# Patient Record
Sex: MALE | Race: WHITE | NOT HISPANIC OR LATINO | Employment: FULL TIME | URBAN - METROPOLITAN AREA
[De-identification: names, ages, dates, MRNs, and addresses within clinical notes are randomized per-mention and may not be internally consistent; named-entity substitution may affect disease eponyms.]

---

## 2018-11-26 DIAGNOSIS — R68.89 EXERCISE INTOLERANCE: Primary | ICD-10-CM

## 2018-11-26 DIAGNOSIS — I25.10 CORONARY ARTERY DISEASE INVOLVING NATIVE CORONARY ARTERY OF NATIVE HEART WITHOUT ANGINA PECTORIS: ICD-10-CM

## 2019-01-11 ENCOUNTER — HOSPITAL ENCOUNTER (OUTPATIENT)
Dept: NON INVASIVE DIAGNOSTICS | Facility: CLINIC | Age: 64
Discharge: HOME | End: 2019-01-11
Payer: COMMERCIAL

## 2019-01-11 DIAGNOSIS — I25.10 CORONARY ARTERY DISEASE INVOLVING NATIVE CORONARY ARTERY OF NATIVE HEART WITHOUT ANGINA PECTORIS: ICD-10-CM

## 2019-01-11 DIAGNOSIS — R68.89 EXERCISE INTOLERANCE: ICD-10-CM

## 2019-01-11 LAB
CHEST PAIN STATEMENT: NORMAL
MAX DIASTOLIC BP: 70 MMHG
MAX HEART RATE: 108 BPM
MAX PREDICTED HEART RATE: 155 BPM
MAX. SYSTOLIC BP: 130 MMHG
PROTOCOL NAME: NORMAL
REASON FOR TERMINATION: NORMAL
TARGET HR FORMULA: NORMAL
TEST INDICATION: NORMAL
TIME IN EXERCISE PHASE: NORMAL

## 2019-01-11 PROCEDURE — A9502 TC99M TETROFOSMIN: HCPCS

## 2019-01-11 PROCEDURE — 93017 CV STRESS TEST TRACING ONLY: CPT

## 2019-01-11 PROCEDURE — 78452 HT MUSCLE IMAGE SPECT MULT: CPT | Performed by: INTERNAL MEDICINE

## 2019-01-11 PROCEDURE — 93016 CV STRESS TEST SUPVJ ONLY: CPT | Performed by: INTERNAL MEDICINE

## 2019-01-11 PROCEDURE — 78452 HT MUSCLE IMAGE SPECT MULT: CPT

## 2019-01-11 PROCEDURE — 93018 CV STRESS TEST I&R ONLY: CPT | Performed by: INTERNAL MEDICINE

## 2019-01-11 RX ADMIN — REGADENOSON 0.4 MG: 0.08 INJECTION, SOLUTION INTRAVENOUS at 13:55

## 2019-03-16 ENCOUNTER — APPOINTMENT (OUTPATIENT)
Dept: LAB | Facility: HOSPITAL | Age: 64
End: 2019-03-16
Payer: COMMERCIAL

## 2019-03-16 ENCOUNTER — TRANSCRIBE ORDERS (OUTPATIENT)
Dept: ADMINISTRATIVE | Facility: HOSPITAL | Age: 64
End: 2019-03-16

## 2019-03-16 DIAGNOSIS — E78.2 MIXED HYPERLIPIDEMIA: ICD-10-CM

## 2019-03-16 DIAGNOSIS — Z12.5 SPECIAL SCREENING FOR MALIGNANT NEOPLASM OF PROSTATE: ICD-10-CM

## 2019-03-16 DIAGNOSIS — E78.2 MIXED HYPERLIPIDEMIA: Primary | ICD-10-CM

## 2019-03-16 LAB
ALBUMIN SERPL BCP-MCNC: 4.6 G/DL (ref 3.5–5.7)
ALP SERPL-CCNC: 38 U/L (ref 55–165)
ALT SERPL W P-5'-P-CCNC: 31 U/L (ref 7–52)
ANION GAP SERPL CALCULATED.3IONS-SCNC: 9 MMOL/L (ref 4–13)
AST SERPL W P-5'-P-CCNC: 26 U/L (ref 13–39)
BILIRUB SERPL-MCNC: 0.6 MG/DL (ref 0.2–1)
BUN SERPL-MCNC: 14 MG/DL (ref 7–25)
CALCIUM SERPL-MCNC: 9.7 MG/DL (ref 8.6–10.5)
CHLORIDE SERPL-SCNC: 106 MMOL/L (ref 98–107)
CHOLEST SERPL-MCNC: 173 MG/DL (ref 0–200)
CO2 SERPL-SCNC: 24 MMOL/L (ref 21–31)
CREAT SERPL-MCNC: 0.87 MG/DL (ref 0.7–1.3)
GFR SERPL CREATININE-BSD FRML MDRD: 90 ML/MIN/1.73SQ M
GLUCOSE P FAST SERPL-MCNC: 107 MG/DL (ref 65–99)
HDLC SERPL-MCNC: 67 MG/DL (ref 40–60)
LDLC SERPL CALC-MCNC: 78 MG/DL (ref 75–193)
NONHDLC SERPL-MCNC: 106 MG/DL
POTASSIUM SERPL-SCNC: 4 MMOL/L (ref 3.5–5.5)
PROT SERPL-MCNC: 7.3 G/DL (ref 6.4–8.9)
PSA SERPL-MCNC: 1.8 NG/ML (ref 0–4)
SODIUM SERPL-SCNC: 139 MMOL/L (ref 134–143)
TRIGL SERPL-MCNC: 138 MG/DL (ref 44–166)

## 2019-03-16 PROCEDURE — 80053 COMPREHEN METABOLIC PANEL: CPT

## 2019-03-16 PROCEDURE — 80061 LIPID PANEL: CPT

## 2019-03-16 PROCEDURE — 36415 COLL VENOUS BLD VENIPUNCTURE: CPT

## 2019-03-16 PROCEDURE — G0103 PSA SCREENING: HCPCS

## 2019-07-31 ENCOUNTER — TELEPHONE (OUTPATIENT)
Dept: CARDIOLOGY CLINIC | Facility: CLINIC | Age: 64
End: 2019-07-31

## 2019-07-31 NOTE — TELEPHONE ENCOUNTER
Bertram Pearson stated he is having muscles aches with lipitor and his PCP stated to check with his cardiologist if he should try crestor or something else  Dr Dario Norton said to stop altogether for 2 weeks then try lipitor every other day  When I called Bertram Pearson, he said he did already stop it over the weekend and he said he isn't sure if its in his head or not, but he already feels better  He will call us with an update  Scanned note in under media

## 2019-08-09 ENCOUNTER — TRANSCRIBE ORDERS (OUTPATIENT)
Dept: ADMINISTRATIVE | Facility: HOSPITAL | Age: 64
End: 2019-08-09

## 2019-08-09 ENCOUNTER — HOSPITAL ENCOUNTER (OUTPATIENT)
Dept: RADIOLOGY | Facility: IMAGING CENTER | Age: 64
Discharge: HOME | End: 2019-08-09
Payer: COMMERCIAL

## 2019-08-09 DIAGNOSIS — S43.62XA SPRAIN OF LEFT STERNOCLAVICULAR JOINT, INITIAL ENCOUNTER: Primary | ICD-10-CM

## 2019-08-09 DIAGNOSIS — S43.62XA SPRAIN OF LEFT STERNOCLAVICULAR JOINT, INITIAL ENCOUNTER: ICD-10-CM

## 2019-08-09 PROCEDURE — 73000 X-RAY EXAM OF COLLAR BONE: CPT

## 2019-08-09 PROCEDURE — 71130 X-RAY STRENOCLAVIC JT 3/>VWS: CPT

## 2019-08-19 ENCOUNTER — OFFICE VISIT (OUTPATIENT)
Dept: CARDIOLOGY CLINIC | Facility: CLINIC | Age: 64
End: 2019-08-19
Payer: COMMERCIAL

## 2019-08-19 VITALS
HEART RATE: 64 BPM | SYSTOLIC BLOOD PRESSURE: 126 MMHG | BODY MASS INDEX: 35.79 KG/M2 | WEIGHT: 250 LBS | HEIGHT: 70 IN | DIASTOLIC BLOOD PRESSURE: 78 MMHG

## 2019-08-19 DIAGNOSIS — I73.9 CLAUDICATION OF BOTH LOWER EXTREMITIES (HCC): ICD-10-CM

## 2019-08-19 DIAGNOSIS — I25.10 CORONARY ARTERY DISEASE INVOLVING NATIVE CORONARY ARTERY OF NATIVE HEART WITHOUT ANGINA PECTORIS: Primary | ICD-10-CM

## 2019-08-19 DIAGNOSIS — E78.5 DYSLIPIDEMIA: ICD-10-CM

## 2019-08-19 DIAGNOSIS — Z95.5 H/O HEART ARTERY STENT: ICD-10-CM

## 2019-08-19 PROCEDURE — 99214 OFFICE O/P EST MOD 30 MIN: CPT | Performed by: INTERNAL MEDICINE

## 2019-08-19 RX ORDER — CHLORAL HYDRATE 500 MG
CAPSULE ORAL DAILY
COMMUNITY
End: 2019-08-19 | Stop reason: ALTCHOICE

## 2019-08-19 RX ORDER — TAMSULOSIN HYDROCHLORIDE 0.4 MG/1
0.8 CAPSULE ORAL
COMMUNITY

## 2019-08-19 RX ORDER — ROSUVASTATIN CALCIUM 10 MG/1
10 TABLET, COATED ORAL DAILY
Qty: 90 TABLET | Refills: 3 | Status: SHIPPED | OUTPATIENT
Start: 2019-08-19 | End: 2020-06-15 | Stop reason: SDUPTHER

## 2019-08-19 NOTE — PATIENT INSTRUCTIONS
Stop your fish oil    Coronary Artery Disease   AMBULATORY CARE:   Coronary artery disease (CAD)  is narrowing of the arteries to your heart caused by a buildup of plaque  Plaque is made up of cholesterol and other substances  The narrowing in your arteries decreases the amount of blood that can flow to your heart  This causes your heart to get less oxygen  You may not have any symptoms of CAD  It is important for you to manage CAD even if you feel well  CAD can lead to a heart attack if it is not managed  Common symptoms include the following:   · Chest pain (angina), causing burning, squeezing, or crushing tightness in your chest    · Pain that spreads to your neck, jaw, or shoulder blade    · Nausea, vomiting, sweating, fainting, and hands and feet that are cold to the touch  Call 911 for any of the following:   · You have any of the following signs of a heart attack:      ¨ Squeezing, pressure, or pain in your chest that lasts longer than 5 minutes or returns    ¨ Discomfort or pain in your back, neck, jaw, stomach, or arm     ¨ Trouble breathing    ¨ Nausea or vomiting    ¨ Lightheadedness or a sudden cold sweat, especially with chest pain or trouble breathing    Contact your healthcare provider if:   · You have chest pain that is more frequent, or you have chest pain at rest     · You have questions or concerns about your condition or care  Medicines used to treat CAD:   · Blood pressure medicines  are given to lower your blood pressure  ACE inhibitors help keep your blood vessels relaxed and open to help keep blood flowing into your heart  Beta-blockers keep your heart pumping strongly and regularly so it does not work too hard to get oxygen  · Cholesterol medicines  help lower blood cholesterol levels  · Nitrates , such as nitroglycerin, relax the arteries of your heart so it gets more oxygen  They help to relieve your chest pain  · Antiplatelets , such as aspirin, help prevent blood clots  Take your antiplatelet medicine exactly as directed  These medicines make it more likely for you to bleed or bruise  If you are told to take aspirin, do not take acetaminophen or ibuprofen instead  · Blood thinners  keep clots from forming in your blood  Clots may cause heart attacks, strokes, or death  This medicine makes it more likely for you to bleed or bruise  · Do not take certain medicines without asking your healthcare provider first   These include NSAIDs, herbal or vitamin supplements, or hormones (estrogen or progestin)  Procedures used to treat CAD:   · Angioplasty  may be done to open an artery blocked by plaque  A tube with a balloon on the end is threaded into the blocked artery  Once the tube is in the artery, the balloon is inflated  As the balloon inflates, it presses the plaque against the artery wall to open the artery  A stent may be placed in your artery to keep it open  · Coronary artery bypass graft surgery (CABG)  is open heart surgery  Healthcare providers take arteries or veins from other areas in your body and use them to bypass or go around the blocked arteries of your heart  Cardiac rehabilitation:  Your healthcare provider may recommend that you attend cardiac rehabilitation (rehab)  This is a program run by specialists who will help you safely strengthen your heart and reduce the risk for more heart disease  The plan includes exercise, relaxation, stress management, and heart-healthy nutrition  Healthcare providers will also check to make sure any medicines you are taking are working  Manage CAD to prevent a heart attack:   · Do not smoke  Nicotine and other chemicals in cigarettes and cigars can cause heart and lung damage  Ask your healthcare provider for information if you currently smoke and need help to quit  E-cigarettes or smokeless tobacco still contain nicotine  Talk to your healthcare provider before you use these products  · Exercise regularly  Exercise at least 30 minutes each day, on most days of the week  Exercise helps to lower high cholesterol and high blood pressure  It can also help you maintain a healthy weight  Ask your healthcare provider about the kind of exercise you should do and how to get started  · Maintain a healthy weight  If you are overweight, talk to your healthcare provider about how to lose weight  A weight loss of 10% can improve your heart health  · Eat heart-healthy foods  Include fresh fruits and vegetables in your meal plan  Choose low-fat foods, such as skim or 1% fat milk, low-fat cheese and yogurt, fish, chicken (without skin), and lean meats  Eat two 4-ounce servings of fish high in omega-3 fats each week, such as salmon, fresh tuna, and herring  Do not eat foods that are high in sodium, such as canned foods, potato chips, salty snacks, and cold cuts  Put less table salt on your food  · Limit or do not drink alcohol  A drink of alcohol is 12 ounces of beer, 5 ounces of wine, or 1½ ounces of liquor  · Manage other health conditions  Follow your healthcare provider's advice on how to manage other conditions that can affect your heart health  These include diabetes, high blood pressure, and high cholesterol  You may need to take medicines for these conditions and make other lifestyle changes  · Ask if you should have a flu vaccine  The flu can be dangerous for a person who has CAD  The flu vaccine is available every year in the fall  Follow up with your healthcare provider as directed: You may need to return for other tests  You may also be referred to a cardiac surgeon  Write down your questions so you remember to ask them during your visits  © 2017 [employer] Information is for End User's use only and may not be sold, redistributed or otherwise used for commercial purposes   All illustrations and images included in CareNotes® are the copyrighted property of [employer]  or [employer]  The above information is an  only  It is not intended as medical advice for individual conditions or treatments  Talk to your doctor, nurse or pharmacist before following any medical regimen to see if it is safe and effective for you

## 2019-08-19 NOTE — PROGRESS NOTES
Subjective:        Patient ID: Vanice Cowden is a 75 y o  male  Chief Complaint:  Arlette Martini is here for routine cardiac follow-up  He status post RCA stenting in 2009  He is an ex-smoker  Lipid profile was excellent in March of this year  No chest pain alarming shortness of breath palpitations presyncope syncope or TIA like symptoms  He stopped his atorvastatin because his legs, particularly his thighs, get very tired when he ambulates, this gets better with few minutes rest and then he can walk again  In 2 weeks off the atorvastatin with your blessing he says he does feel a bit better  He is wondering if this isn't subjective though, he is not quite sure  He was open to feel much better and  He has not  Exam suggest diminished PT pulses bilaterally +1 radial pulses +2 bilaterally carotid upstroke 2 to 3+ bilaterally    The following portions of the patient's history were reviewed and updated as appropriate: allergies, current medications, past family history, past medical history, past social history, past surgical history and problem list   Review of Systems   Constitution: Negative for chills, diaphoresis, malaise/fatigue and weight gain  HENT: Negative for nosebleeds and stridor  Eyes: Negative for double vision, vision loss in left eye, vision loss in right eye and visual disturbance  Cardiovascular: Positive for claudication  Negative for chest pain, cyanosis, dyspnea on exertion, irregular heartbeat, leg swelling, near-syncope, orthopnea, palpitations, paroxysmal nocturnal dyspnea and syncope  Respiratory: Negative for cough, shortness of breath, snoring and wheezing  Endocrine: Negative for polydipsia, polyphagia and polyuria  Hematologic/Lymphatic: Negative for bleeding problem  Does not bruise/bleed easily  Skin: Negative for flushing and rash  Musculoskeletal: Negative for falls and myalgias     Gastrointestinal: Negative for abdominal pain, heartburn, hematemesis, hematochezia, melena and nausea  Genitourinary: Negative for hematuria  Neurological: Negative for brief paralysis, dizziness, focal weakness, headaches, light-headedness, loss of balance and vertigo  Psychiatric/Behavioral: Negative for altered mental status and substance abuse  Allergic/Immunologic: Negative for hives  Objective:      /78   Pulse 64   Ht 5' 10" (1 778 m)   Wt 113 kg (250 lb)   BMI 35 87 kg/m²   Physical Exam   Constitutional: He is oriented to person, place, and time  He appears well-developed and well-nourished  No distress  HENT:   Head: Normocephalic and atraumatic  Eyes: Pupils are equal, round, and reactive to light  EOM are normal  No scleral icterus  Neck: Normal range of motion  Neck supple  No JVD present  No thyromegaly present  Cardiovascular: Normal rate, regular rhythm and normal heart sounds  Exam reveals no gallop and no friction rub  No murmur heard  Pulmonary/Chest: Effort normal and breath sounds normal  No stridor  No respiratory distress  He has no wheezes  He has no rales  Abdominal: Soft  Bowel sounds are normal  He exhibits no distension and no mass  There is no tenderness  Musculoskeletal: Normal range of motion  He exhibits no edema or deformity  Neurological: He is alert and oriented to person, place, and time  Coordination normal    Skin: Skin is warm and dry  No erythema  No pallor  Psychiatric: He has a normal mood and affect   His behavior is normal        Lab Review:   Appointment on 03/16/2019   Component Date Value    Sodium 03/16/2019 139     Potassium 03/16/2019 4 0     Chloride 03/16/2019 106     CO2 03/16/2019 24     ANION GAP 03/16/2019 9     BUN 03/16/2019 14     Creatinine 03/16/2019 0 87     Glucose, Fasting 03/16/2019 107*    Calcium 03/16/2019 9 7     AST 03/16/2019 26     ALT 03/16/2019 31     Alkaline Phosphatase 03/16/2019 38*    Total Protein 03/16/2019 7 3     Albumin 03/16/2019 4 6     Total Bilirubin 03/16/2019 0 60     eGFR 03/16/2019 90     PSA 03/16/2019 1 8     Cholesterol 03/16/2019 173     Triglycerides 03/16/2019 138     HDL, Direct 03/16/2019 67*    LDL Calculated 03/16/2019 78     Non-HDL-Chol (CHOL-HDL) 03/16/2019 106      Xr Sternoclavicular Joints 4+ Vws    Result Date: 8/14/2019  Narrative: STERNOCLAVICULAR JOINTS INDICATION:   S43  62XA: Sprain of left sternoclavicular joint, initial encounter  COMPARISON:  None VIEWS:  XR STERNOCLAVICULAR JOINTS 4+ VWS FINDINGS: There is no acute fracture  The osseous structures appear normally aligned on these views  Alignment is best evaluated by direct physical exam   Degenerative changes of the bilateral sternoclavicular joints  Soft tissues are unremarkable  Impression: No acute osseous abnormality  Degenerative changes the bilateral sternoclavicular joint  Workstation performed: [id number]     Xr Clavicle Left    Result Date: 8/14/2019  Narrative: LEFT CLAVICLE INDICATION:   S43  62XA: Sprain of left sternoclavicular joint, initial encounter  COMPARISON:  None VIEWS:  XR CLAVICLE LEFT FINDINGS: There is no acute fracture or dislocation  There is a BB overlying the medial aspect of the clavicle  Moderate degenerative changes of the acromioclavicular joint with well-corticated densities in the soft tissues just dorsal to the acromion midclavicular joint  Mild degenerative changes of the glenohumeral joint  Well-corticated density in the soft tissues above the humeral head  No lytic or blastic lesions are seen  Soft tissues are unremarkable  Impression: 1  No acute osseous abnormality  2   Moderate degenerative changes acromioclavicular joint and mild degenerative changes glenohumeral joint  Workstation performed: [id number]         Assessment:       1   Coronary artery disease involving native coronary artery of native heart without angina pectoris  VAS abdominal aorta/iliacs; complete study    VAS lower limb venous duplex study, complete bilateral    rosuvastatin (CRESTOR) 10 MG tablet    AST    ALT    LDL cholesterol, direct   2  H/O heart artery stent     3  Dyslipidemia  AST    ALT    LDL cholesterol, direct   4  Claudication of both lower extremities ([address] )          Plan:        Arlette Martini is without any signs or symptoms reminiscent of angina pectoris, heart failure nor electrical instability  Nuclear stress test in January this year nonischemic revealed normal LV function auscultation does not suggest any valvulopathy  Rather than cholesterol-lowering agent myositis clinically he sounds like he is having more claudication  For this reason I ordered an abdominal ultrasound since he is over 60   And used to smoke to rule out AAA and lower extremity arterial Doppler study bilaterally  He remains smoke-free  For this reason I would like to try him on alternative statin, lower dose, Crestor 10 mg daily  He will stay off atorvastatin  If severe muscle aches and pains arise I told to give me a call and I will check a serum CPK /some blood work to confirm  or refute CLAM  Explained the importance statin therapy in his management  I ordered transaminases and LDL direct cholesterol in 8 weeks  I told him he could stop his fish oil which he has been taking for cholesterol health  This may raise LDL cholesterol  I will bring him back in 2 months to review his blood work , see how he is doing on the new statin, and Doppler studies

## 2019-09-19 ENCOUNTER — HOSPITAL ENCOUNTER (OUTPATIENT)
Dept: NON INVASIVE DIAGNOSTICS | Facility: CLINIC | Age: 64
Discharge: HOME | End: 2019-09-19
Payer: COMMERCIAL

## 2019-09-19 ENCOUNTER — TRANSCRIBE ORDERS (OUTPATIENT)
Dept: NON INVASIVE DIAGNOSTICS | Facility: CLINIC | Age: 64
End: 2019-09-19

## 2019-09-19 ENCOUNTER — APPOINTMENT (OUTPATIENT)
Dept: NON INVASIVE DIAGNOSTICS | Facility: CLINIC | Age: 64
End: 2019-09-19
Payer: COMMERCIAL

## 2019-09-19 DIAGNOSIS — I73.9 CLAUDICATION (HCC): Primary | ICD-10-CM

## 2019-09-19 DIAGNOSIS — I25.10 CORONARY ARTERY DISEASE INVOLVING NATIVE CORONARY ARTERY OF NATIVE HEART WITHOUT ANGINA PECTORIS: ICD-10-CM

## 2019-09-19 DIAGNOSIS — I73.9 CLAUDICATION (HCC): ICD-10-CM

## 2019-09-19 PROCEDURE — 93978 VASCULAR STUDY: CPT

## 2019-09-19 PROCEDURE — 93925 LOWER EXTREMITY STUDY: CPT | Performed by: SURGERY

## 2019-09-19 PROCEDURE — 93923 UPR/LXTR ART STDY 3+ LVLS: CPT

## 2019-09-19 PROCEDURE — 93925 LOWER EXTREMITY STUDY: CPT

## 2019-09-19 PROCEDURE — 93922 UPR/L XTREMITY ART 2 LEVELS: CPT | Performed by: SURGERY

## 2019-09-19 PROCEDURE — 93978 VASCULAR STUDY: CPT | Performed by: SURGERY

## 2020-04-07 ENCOUNTER — OFFICE VISIT (OUTPATIENT)
Dept: CARDIOLOGY CLINIC | Facility: CLINIC | Age: 65
End: 2020-04-07
Payer: COMMERCIAL

## 2020-04-07 ENCOUNTER — TRANSCRIBE ORDERS (OUTPATIENT)
Dept: LAB | Facility: CLINIC | Age: 65
End: 2020-04-07

## 2020-04-07 ENCOUNTER — APPOINTMENT (OUTPATIENT)
Dept: LAB | Facility: CLINIC | Age: 65
End: 2020-04-07
Payer: COMMERCIAL

## 2020-04-07 VITALS
BODY MASS INDEX: 36.22 KG/M2 | DIASTOLIC BLOOD PRESSURE: 70 MMHG | WEIGHT: 253 LBS | SYSTOLIC BLOOD PRESSURE: 104 MMHG | HEIGHT: 70 IN | HEART RATE: 68 BPM

## 2020-04-07 DIAGNOSIS — E78.2 MIXED HYPERLIPIDEMIA: Primary | ICD-10-CM

## 2020-04-07 DIAGNOSIS — I25.10 CORONARY ARTERY DISEASE INVOLVING NATIVE CORONARY ARTERY OF NATIVE HEART WITHOUT ANGINA PECTORIS: ICD-10-CM

## 2020-04-07 DIAGNOSIS — E78.5 DYSLIPIDEMIA: ICD-10-CM

## 2020-04-07 DIAGNOSIS — Z12.5 SCREENING FOR PROSTATE CANCER: ICD-10-CM

## 2020-04-07 DIAGNOSIS — E78.2 MIXED HYPERLIPIDEMIA: ICD-10-CM

## 2020-04-07 DIAGNOSIS — I25.10 CORONARY ARTERY DISEASE INVOLVING NATIVE CORONARY ARTERY OF NATIVE HEART WITHOUT ANGINA PECTORIS: Primary | ICD-10-CM

## 2020-04-07 LAB
ALBUMIN SERPL BCP-MCNC: 4 G/DL (ref 3.5–5)
ALP SERPL-CCNC: 57 U/L (ref 46–116)
ALT SERPL W P-5'-P-CCNC: 39 U/L (ref 12–78)
ANION GAP SERPL CALCULATED.3IONS-SCNC: 6 MMOL/L (ref 4–13)
AST SERPL W P-5'-P-CCNC: 27 U/L (ref 5–45)
BILIRUB SERPL-MCNC: 0.57 MG/DL (ref 0.2–1)
BUN SERPL-MCNC: 12 MG/DL (ref 5–25)
CALCIUM SERPL-MCNC: 9.2 MG/DL (ref 8.3–10.1)
CHLORIDE SERPL-SCNC: 110 MMOL/L (ref 100–108)
CHOLEST SERPL-MCNC: 162 MG/DL (ref 50–200)
CO2 SERPL-SCNC: 25 MMOL/L (ref 21–32)
CREAT SERPL-MCNC: 0.82 MG/DL (ref 0.6–1.3)
GFR SERPL CREATININE-BSD FRML MDRD: 92 ML/MIN/1.73SQ M
GLUCOSE P FAST SERPL-MCNC: 97 MG/DL (ref 65–99)
HDLC SERPL-MCNC: 62 MG/DL
LDLC SERPL CALC-MCNC: 65 MG/DL (ref 0–100)
LDLC SERPL DIRECT ASSAY-MCNC: 74 MG/DL (ref 0–100)
NONHDLC SERPL-MCNC: 100 MG/DL
POTASSIUM SERPL-SCNC: 4.5 MMOL/L (ref 3.5–5.3)
PROT SERPL-MCNC: 7.2 G/DL (ref 6.4–8.2)
PSA SERPL-MCNC: 2.3 NG/ML (ref 0–4)
SODIUM SERPL-SCNC: 141 MMOL/L (ref 136–145)
TRIGL SERPL-MCNC: 175 MG/DL

## 2020-04-07 PROCEDURE — 93000 ELECTROCARDIOGRAM COMPLETE: CPT | Performed by: PHYSICIAN ASSISTANT

## 2020-04-07 PROCEDURE — 36415 COLL VENOUS BLD VENIPUNCTURE: CPT

## 2020-04-07 PROCEDURE — G0103 PSA SCREENING: HCPCS

## 2020-04-07 PROCEDURE — 99214 OFFICE O/P EST MOD 30 MIN: CPT | Performed by: PHYSICIAN ASSISTANT

## 2020-04-07 PROCEDURE — 80061 LIPID PANEL: CPT

## 2020-04-07 PROCEDURE — 83721 ASSAY OF BLOOD LIPOPROTEIN: CPT

## 2020-04-07 PROCEDURE — 80053 COMPREHEN METABOLIC PANEL: CPT

## 2020-06-15 DIAGNOSIS — I25.10 CORONARY ARTERY DISEASE INVOLVING NATIVE CORONARY ARTERY OF NATIVE HEART WITHOUT ANGINA PECTORIS: ICD-10-CM

## 2020-06-17 RX ORDER — ROSUVASTATIN CALCIUM 10 MG/1
10 TABLET, COATED ORAL DAILY
Qty: 90 TABLET | Refills: 3 | Status: SHIPPED | OUTPATIENT
Start: 2020-06-17

## 2020-09-01 ENCOUNTER — TRANSCRIBE ORDERS (OUTPATIENT)
Dept: LAB | Facility: CLINIC | Age: 65
End: 2020-09-01

## 2020-09-01 ENCOUNTER — APPOINTMENT (OUTPATIENT)
Dept: LAB | Facility: CLINIC | Age: 65
End: 2020-09-01
Payer: COMMERCIAL

## 2020-09-01 DIAGNOSIS — R25.2 MUSCLE CRAMPS: Primary | ICD-10-CM

## 2020-09-01 DIAGNOSIS — R25.2 MUSCLE CRAMPS: ICD-10-CM

## 2020-09-01 LAB
CK MB SERPL-MCNC: 2 % (ref 0–2.5)
CK MB SERPL-MCNC: 6.7 NG/ML (ref 0–5)
CK SERPL-CCNC: 335 U/L (ref 39–308)
ERYTHROCYTE [SEDIMENTATION RATE] IN BLOOD: 12 MM/HOUR (ref 0–19)
TSH SERPL DL<=0.05 MIU/L-ACNC: 1.62 UIU/ML (ref 0.36–3.74)

## 2020-09-01 PROCEDURE — 84443 ASSAY THYROID STIM HORMONE: CPT

## 2020-09-01 PROCEDURE — 36415 COLL VENOUS BLD VENIPUNCTURE: CPT

## 2020-09-01 PROCEDURE — 82550 ASSAY OF CK (CPK): CPT

## 2020-09-01 PROCEDURE — 82085 ASSAY OF ALDOLASE: CPT

## 2020-09-01 PROCEDURE — 82553 CREATINE MB FRACTION: CPT

## 2020-09-01 PROCEDURE — 85652 RBC SED RATE AUTOMATED: CPT

## 2020-09-02 LAB — ALDOLASE SERPL-CCNC: 4.8 U/L (ref 3.3–10.3)

## 2021-01-18 ENCOUNTER — APPOINTMENT (OUTPATIENT)
Dept: RADIOLOGY | Facility: CLINIC | Age: 66
End: 2021-01-18
Payer: COMMERCIAL

## 2021-01-18 ENCOUNTER — TRANSCRIBE ORDERS (OUTPATIENT)
Dept: URGENT CARE | Facility: CLINIC | Age: 66
End: 2021-01-18

## 2021-01-18 DIAGNOSIS — M25.559 HIP PAIN: ICD-10-CM

## 2021-01-18 DIAGNOSIS — M54.50 LOW BACK PAIN, UNSPECIFIED BACK PAIN LATERALITY, UNSPECIFIED CHRONICITY, UNSPECIFIED WHETHER SCIATICA PRESENT: ICD-10-CM

## 2021-01-18 DIAGNOSIS — M54.50 LOW BACK PAIN, UNSPECIFIED BACK PAIN LATERALITY, UNSPECIFIED CHRONICITY, UNSPECIFIED WHETHER SCIATICA PRESENT: Primary | ICD-10-CM

## 2021-01-18 PROCEDURE — 72170 X-RAY EXAM OF PELVIS: CPT

## 2021-01-18 PROCEDURE — 72110 X-RAY EXAM L-2 SPINE 4/>VWS: CPT

## 2021-01-18 PROCEDURE — 72070 X-RAY EXAM THORAC SPINE 2VWS: CPT

## 2021-05-21 ENCOUNTER — TRANSCRIBE ORDERS (OUTPATIENT)
Dept: LAB | Facility: CLINIC | Age: 66
End: 2021-05-21

## 2021-05-21 ENCOUNTER — APPOINTMENT (OUTPATIENT)
Dept: LAB | Facility: CLINIC | Age: 66
End: 2021-05-21
Payer: COMMERCIAL

## 2021-05-21 DIAGNOSIS — Z12.5 SCREENING FOR PROSTATE CANCER: ICD-10-CM

## 2021-05-21 DIAGNOSIS — E78.2 MIXED HYPERLIPIDEMIA: ICD-10-CM

## 2021-05-21 DIAGNOSIS — E78.2 MIXED HYPERLIPIDEMIA: Primary | ICD-10-CM

## 2021-05-21 LAB
ALBUMIN SERPL BCP-MCNC: 4.1 G/DL (ref 3.5–5)
ALP SERPL-CCNC: 47 U/L (ref 46–116)
ALT SERPL W P-5'-P-CCNC: 39 U/L (ref 12–78)
ANION GAP SERPL CALCULATED.3IONS-SCNC: 5 MMOL/L (ref 4–13)
AST SERPL W P-5'-P-CCNC: 26 U/L (ref 5–45)
BILIRUB SERPL-MCNC: 0.68 MG/DL (ref 0.2–1)
BUN SERPL-MCNC: 14 MG/DL (ref 5–25)
CALCIUM SERPL-MCNC: 9.5 MG/DL (ref 8.3–10.1)
CHLORIDE SERPL-SCNC: 110 MMOL/L (ref 100–108)
CHOLEST SERPL-MCNC: 169 MG/DL (ref 50–200)
CO2 SERPL-SCNC: 26 MMOL/L (ref 21–32)
CREAT SERPL-MCNC: 0.83 MG/DL (ref 0.6–1.3)
GFR SERPL CREATININE-BSD FRML MDRD: 90 ML/MIN/1.73SQ M
GLUCOSE P FAST SERPL-MCNC: 94 MG/DL (ref 65–99)
HDLC SERPL-MCNC: 74 MG/DL
LDLC SERPL CALC-MCNC: 70 MG/DL (ref 0–100)
NONHDLC SERPL-MCNC: 95 MG/DL
POTASSIUM SERPL-SCNC: 4.7 MMOL/L (ref 3.5–5.3)
PROT SERPL-MCNC: 7.3 G/DL (ref 6.4–8.2)
PSA SERPL-MCNC: 2.8 NG/ML (ref 0–4)
SODIUM SERPL-SCNC: 141 MMOL/L (ref 136–145)
TRIGL SERPL-MCNC: 127 MG/DL

## 2021-05-21 PROCEDURE — G0103 PSA SCREENING: HCPCS

## 2021-05-21 PROCEDURE — 80061 LIPID PANEL: CPT

## 2021-05-21 PROCEDURE — 80053 COMPREHEN METABOLIC PANEL: CPT

## 2021-05-21 PROCEDURE — 36415 COLL VENOUS BLD VENIPUNCTURE: CPT

## 2021-06-21 ENCOUNTER — OFFICE VISIT (OUTPATIENT)
Dept: CARDIOLOGY CLINIC | Facility: CLINIC | Age: 66
End: 2021-06-21
Payer: COMMERCIAL

## 2021-06-21 VITALS
BODY MASS INDEX: 35.93 KG/M2 | SYSTOLIC BLOOD PRESSURE: 124 MMHG | WEIGHT: 251 LBS | HEIGHT: 70 IN | DIASTOLIC BLOOD PRESSURE: 70 MMHG

## 2021-06-21 DIAGNOSIS — E78.2 MIXED HYPERLIPIDEMIA: Primary | ICD-10-CM

## 2021-06-21 DIAGNOSIS — I25.10 CORONARY ARTERY DISEASE INVOLVING NATIVE CORONARY ARTERY OF NATIVE HEART WITHOUT ANGINA PECTORIS: ICD-10-CM

## 2021-06-21 PROCEDURE — 99214 OFFICE O/P EST MOD 30 MIN: CPT | Performed by: NURSE PRACTITIONER

## 2021-06-21 PROCEDURE — 93000 ELECTROCARDIOGRAM COMPLETE: CPT | Performed by: NURSE PRACTITIONER

## 2021-06-21 RX ORDER — TIZANIDINE 4 MG/1
4 TABLET ORAL
COMMUNITY
Start: 2021-06-02

## 2021-06-21 NOTE — PROGRESS NOTES
Patient ID: Jesse Wooten is a 74 y o  male  Plan:      Mixed hyperlipidemia  Continue Crestor 10 mg daily   Tolerating without issue    Coronary artery disease involving native coronary artery of native heart without angina pectoris  S/p SURI to pRCA and D2 6/2009   Nonischemic Justen Yu 1/2019 - has had exertional dyspnea since that time  Continue asa, statin       Follow up Plan/Summary Comments:   Debi Tai continues to experience muscle cramps and leg discomfort  He held his Crestor for 1 week without noted improvement so he restarted this  He is planning to follow with orthopedics for further evaluation of his joint pain and muscle cramps  He had normal vascular studies in September 2019  Exertional dyspnea continues without change  He had nuclear stress imaging performed in January of 2019 for evaluation of these symptoms  Continue medications without change  Follow-up in 1 year, sooner if needed    HPI:  I had the pleasure of meeting Debi Tai in the office today for a follow-up evaluation  Debi Tai is a pleasant 58-year-old male with prior cardiac history of CAD for which he underwent SURI to the proximal RCA and D2 6/2009  He denies any chest pain, discomfort, palpitations  He does report exertional dyspnea that remains unchanged from prior  This was previously evaluated with a nuclear stress test in 2019 and he reports his symptoms are stable since that time  His biggest concern is joint and muscle pain  This appears to have been ongoing for quite some time  He had normal vascular studies completed in September 2019  It was discussed at his last office visit in April 2020 to hold his statin medication to see if there was improvement  It is unclear whether or not that was done at that time  However, he notes that he recently held his Crestor for 1 week and did not note any improvement, therefore he resumed his medication    He is planning to follow-up with Orthopedics in the near future  Review of Systems   10  point ROS  was otherwise non pertinent or negative except as per HPI or as below  Gait: Normal      Most recent or relevant cardiac/vascular testing:    EKG 6/21/2021 SR with sinus arrhythmia     NM stress 1/11/2019 normal, EF 56%      Objective:     /70   Ht 5' 10" (1 778 m)   Wt 114 kg (251 lb)   BMI 36 01 kg/m²     PHYSICAL EXAM:    General:  Normal appearance, no acute distress  Eyes:  Anicteric  Oral mucosa:  Moist   Neck:  No JVD  Carotid upstrokes are brisk without bruits  No masses  Chest:  Clear to auscultation   Cardiac:  No palpable PMI  Normal S1 and S2  No murmur gallop or rub  Abdomen:  Soft and nontender  No palpable organomegaly or aortic enlargement  Extremities:  No peripheral edema  Musculoskeletal:  Symmetric  Vascular:  Pedal pulses are intact  Neuro:  Grossly symmetric  Psych:  Alert and oriented x3      No Known Allergies    Current Outpatient Medications:     aspirin 81 MG tablet, Take 81 mg by mouth daily , Disp: , Rfl:     Multiple Vitamin (MULTIVITAMIN) tablet, Take 1 tablet by mouth daily, Disp: , Rfl:     rosuvastatin (CRESTOR) 10 MG tablet, Take 1 tablet (10 mg total) by mouth daily, Disp: 90 tablet, Rfl: 3    tamsulosin (FLOMAX) 0 4 mg, Take 0 8 mg by mouth, Disp: , Rfl:     tiZANidine (ZANAFLEX) 4 mg tablet, Take 4 mg by mouth daily at bedtime, Disp: , Rfl:   Past Medical History:   Diagnosis Date    Coronary artery disease     s/p stenting    Hyperlipidemia     Hypertension      Past Surgical History:   Procedure Laterality Date    ANTERIOR FUSION LUMBAR SPINE      BACK SURGERY      CARPAL TUNNEL RELEASE      CORONARY ANGIOPLASTY WITH STENT PLACEMENT  06/23/2009    SURI to 95% prox RCA and 99% D2     INGUINAL HERNIA REPAIR      NASAL TURBINATE REDUCTION Bilateral 10/14/2019    with outfracture - office procedure - Dr Lauri Bradley:   Lab Results Component Value Date    K 4 7 2021     (H) 2021    CO2 26 2021    BUN 14 2021    CREATININE 0 83 2021    EGFR 90 2021     Lipid Profile:    Lab Results   Component Value Date    TRIG 127 2021    HDL 74 2021         Social History     Tobacco Use   Smoking Status Former Smoker    Quit date:     Years since quittin 4   Smokeless Tobacco Never Used

## 2021-06-21 NOTE — ASSESSMENT & PLAN NOTE
S/p SURI to pRCA and D2 6/2009   Nonischemic Everlina Quinonez 1/2019 - has had exertional dyspnea since that time       Continue asa, statin

## 2022-02-24 ENCOUNTER — APPOINTMENT (OUTPATIENT)
Dept: LAB | Facility: CLINIC | Age: 67
End: 2022-02-24
Payer: COMMERCIAL

## 2022-02-24 DIAGNOSIS — E78.2 MIXED HYPERLIPIDEMIA: ICD-10-CM

## 2022-02-24 LAB
ALBUMIN SERPL BCP-MCNC: 3.8 G/DL (ref 3.5–5)
ALP SERPL-CCNC: 54 U/L (ref 46–116)
ALT SERPL W P-5'-P-CCNC: 39 U/L (ref 12–78)
ANION GAP SERPL CALCULATED.3IONS-SCNC: 4 MMOL/L (ref 4–13)
AST SERPL W P-5'-P-CCNC: 26 U/L (ref 5–45)
BILIRUB SERPL-MCNC: 0.27 MG/DL (ref 0.2–1)
BUN SERPL-MCNC: 18 MG/DL (ref 5–25)
CALCIUM SERPL-MCNC: 9.7 MG/DL (ref 8.3–10.1)
CHLORIDE SERPL-SCNC: 112 MMOL/L (ref 100–108)
CHOLEST SERPL-MCNC: 169 MG/DL
CO2 SERPL-SCNC: 26 MMOL/L (ref 21–32)
CREAT SERPL-MCNC: 1.09 MG/DL (ref 0.6–1.3)
GFR SERPL CREATININE-BSD FRML MDRD: 68 ML/MIN/1.73SQ M
GLUCOSE P FAST SERPL-MCNC: 107 MG/DL (ref 65–99)
HDLC SERPL-MCNC: 55 MG/DL
LDLC SERPL CALC-MCNC: 54 MG/DL (ref 0–100)
NONHDLC SERPL-MCNC: 114 MG/DL
POTASSIUM SERPL-SCNC: 5 MMOL/L (ref 3.5–5.3)
PROT SERPL-MCNC: 7.1 G/DL (ref 6.4–8.2)
SODIUM SERPL-SCNC: 142 MMOL/L (ref 136–145)
TRIGL SERPL-MCNC: 298 MG/DL

## 2022-02-24 PROCEDURE — 80053 COMPREHEN METABOLIC PANEL: CPT

## 2022-02-24 PROCEDURE — 36415 COLL VENOUS BLD VENIPUNCTURE: CPT

## 2022-02-24 PROCEDURE — 80061 LIPID PANEL: CPT

## 2022-06-17 ENCOUNTER — OFFICE VISIT (OUTPATIENT)
Dept: CARDIOLOGY CLINIC | Facility: CLINIC | Age: 67
End: 2022-06-17
Payer: COMMERCIAL

## 2022-06-17 VITALS
WEIGHT: 247 LBS | DIASTOLIC BLOOD PRESSURE: 64 MMHG | BODY MASS INDEX: 35.36 KG/M2 | SYSTOLIC BLOOD PRESSURE: 122 MMHG | HEIGHT: 70 IN | HEART RATE: 60 BPM

## 2022-06-17 DIAGNOSIS — E78.2 MIXED HYPERLIPIDEMIA: ICD-10-CM

## 2022-06-17 DIAGNOSIS — I25.10 CORONARY ARTERY DISEASE INVOLVING NATIVE CORONARY ARTERY OF NATIVE HEART WITHOUT ANGINA PECTORIS: Primary | ICD-10-CM

## 2022-06-17 PROCEDURE — 99214 OFFICE O/P EST MOD 30 MIN: CPT | Performed by: NURSE PRACTITIONER

## 2022-06-17 PROCEDURE — 93000 ELECTROCARDIOGRAM COMPLETE: CPT | Performed by: NURSE PRACTITIONER

## 2022-06-17 NOTE — PROGRESS NOTES
Patient ID: Hugh Lui is a 69 y o  male  Plan:      Coronary artery disease involving native coronary artery of native heart without angina pectoris  S/p SURI to pRCA and D2 6/2009   Nonischemic Norva Edgar 1/2019 - has had exertional dyspnea since that time, but this remains unchanged    Continue asa, statin    Mixed hyperlipidemia  Continue Crestor 10 mg daily   Tolerating without issue       Follow up Plan/Summary Comments:  Katty Medina is doing well from a cardiac perspective  I have not made any medication adjustments today  He is very pleased that his weight has remained stable and has been working hard to lose the weight he gained during a period of inactivity due to back pain  We discussed reducing his alcohol consumption which will also likely help with his weight loss  Follow-up in 1 year, sooner if needed    HPI:  I had the pleasure of seeing Katty Medina in the office today for routine follow-up visit  Since his last visit 1 year ago, he had some issues with back pain  He recently had an injection and is feeling much better  He had a period of time of reduced activity due to the back pain and had some weight gain  He has lost the weight now with increasing his activity making some dietary changes  He denies any chest pain, pressure, tightness  He does have occasional exertional dyspnea which has been ongoing for some time  He does note that sometimes when walking, his legs feel stronger and other times they feel as though they may give out  He attributes this to his back issues  Review of Systems   10  point ROS  was otherwise non pertinent or negative except as per HPI or as below     Gait: Normal      Most recent or relevant cardiac/vascular testing:    NM stress 1/11/2019 normal, EF 56%     Results for orders placed or performed in visit on 06/17/22   POCT ECG    Impression    SR with SA, IRBBB           Objective:     /64   Pulse 60   Ht 5' 10" (1 778 m)   Wt 112 kg (247 lb)   BMI 35 44 kg/m²     PHYSICAL EXAM:    General:  Normal appearance, no acute distress  Eyes:  Anicteric  Oral mucosa:  Moist   Neck:  No JVD  Carotid upstrokes are brisk without bruits  No masses  Chest:  Clear to auscultation   Cardiac:  No palpable PMI  Normal S1 and S2  No murmur gallop or rub  Abdomen:  Soft and nontender  No palpable organomegaly or aortic enlargement  Extremities:  Trace edema R ankle, chronic  Musculoskeletal:  Symmetric  Vascular:  Pedal pulses are intact  Neuro:  Grossly symmetric  Psych:  Alert and oriented x3      No Known Allergies    Current Outpatient Medications:     aspirin 81 MG tablet, Take 81 mg by mouth daily , Disp: , Rfl:     Multiple Vitamin (MULTIVITAMIN) tablet, Take 1 tablet by mouth daily, Disp: , Rfl:     rosuvastatin (CRESTOR) 10 MG tablet, Take 1 tablet (10 mg total) by mouth daily, Disp: 90 tablet, Rfl: 3    tamsulosin (FLOMAX) 0 4 mg, Take 0 8 mg by mouth, Disp: , Rfl:     tiZANidine (ZANAFLEX) 4 mg tablet, Take 4 mg by mouth daily at bedtime (Patient not taking: Reported on 6/17/2022), Disp: , Rfl:   Past Medical History:   Diagnosis Date    Coronary artery disease     s/p stenting    History of epidural anesthesia     back L3/L4    Hyperlipidemia     Hypertension      Past Surgical History:   Procedure Laterality Date    ANTERIOR FUSION LUMBAR SPINE      BACK SURGERY      CARPAL TUNNEL RELEASE      CORONARY ANGIOPLASTY WITH STENT PLACEMENT  06/23/2009    SURI to 95% prox RCA and 99% D2     INGUINAL HERNIA REPAIR      NASAL TURBINATE REDUCTION Bilateral 10/14/2019    with outfracture - office procedure - Dr She De Oliveira         CMP:   Lab Results   Component Value Date    K 5 0 02/24/2022     (H) 02/24/2022    CO2 26 02/24/2022    BUN 18 02/24/2022    CREATININE 1 09 02/24/2022    EGFR 68 02/24/2022     Lipid Profile:    Lab Results   Component Value Date    TRIG 298 (H) 2022    HDL 55 2022         Social History     Tobacco Use   Smoking Status Former Smoker    Quit date:     Years since quittin 4   Smokeless Tobacco Never Used

## 2022-06-17 NOTE — ASSESSMENT & PLAN NOTE
S/p SURI to pRCA and D2 6/2009   Nonischemic Jannette Eugene 1/2019 - has had exertional dyspnea since that time, but this remains unchanged    Continue asa, statin

## 2022-09-01 ENCOUNTER — APPOINTMENT (OUTPATIENT)
Dept: LAB | Facility: CLINIC | Age: 67
End: 2022-09-01
Payer: COMMERCIAL

## 2022-09-01 DIAGNOSIS — Z11.59 SPECIAL SCREENING EXAMINATION FOR VIRAL DISEASE: ICD-10-CM

## 2022-09-01 DIAGNOSIS — E78.2 MIXED HYPERLIPIDEMIA: ICD-10-CM

## 2022-09-01 DIAGNOSIS — Z12.5 SPECIAL SCREENING FOR MALIGNANT NEOPLASM OF PROSTATE: ICD-10-CM

## 2022-09-01 LAB
ALBUMIN SERPL BCP-MCNC: 3.7 G/DL (ref 3.5–5)
ALP SERPL-CCNC: 44 U/L (ref 46–116)
ALT SERPL W P-5'-P-CCNC: 51 U/L (ref 12–78)
ANION GAP SERPL CALCULATED.3IONS-SCNC: 4 MMOL/L (ref 4–13)
AST SERPL W P-5'-P-CCNC: 26 U/L (ref 5–45)
BILIRUB SERPL-MCNC: 0.54 MG/DL (ref 0.2–1)
BUN SERPL-MCNC: 17 MG/DL (ref 5–25)
CALCIUM SERPL-MCNC: 8.9 MG/DL (ref 8.3–10.1)
CHLORIDE SERPL-SCNC: 108 MMOL/L (ref 96–108)
CHOLEST SERPL-MCNC: 173 MG/DL
CO2 SERPL-SCNC: 26 MMOL/L (ref 21–32)
CREAT SERPL-MCNC: 1.16 MG/DL (ref 0.6–1.3)
GFR SERPL CREATININE-BSD FRML MDRD: 63 ML/MIN/1.73SQ M
GLUCOSE P FAST SERPL-MCNC: 97 MG/DL (ref 65–99)
HCV AB SER QL: NORMAL
HDLC SERPL-MCNC: 78 MG/DL
LDLC SERPL CALC-MCNC: 81 MG/DL (ref 0–100)
NONHDLC SERPL-MCNC: 95 MG/DL
POTASSIUM SERPL-SCNC: 4.2 MMOL/L (ref 3.5–5.3)
PROT SERPL-MCNC: 7.1 G/DL (ref 6.4–8.4)
PSA SERPL-MCNC: 2.8 NG/ML (ref 0–4)
SODIUM SERPL-SCNC: 138 MMOL/L (ref 135–147)
TRIGL SERPL-MCNC: 71 MG/DL

## 2022-09-01 PROCEDURE — 80053 COMPREHEN METABOLIC PANEL: CPT

## 2022-09-01 PROCEDURE — 80061 LIPID PANEL: CPT

## 2022-09-01 PROCEDURE — G0103 PSA SCREENING: HCPCS

## 2022-09-01 PROCEDURE — 87389 HIV-1 AG W/HIV-1&-2 AB AG IA: CPT

## 2022-09-01 PROCEDURE — 36415 COLL VENOUS BLD VENIPUNCTURE: CPT

## 2022-09-01 PROCEDURE — 86803 HEPATITIS C AB TEST: CPT

## 2022-09-02 LAB — HIV 1+2 AB+HIV1 P24 AG SERPL QL IA: NORMAL

## 2023-02-13 ENCOUNTER — APPOINTMENT (OUTPATIENT)
Dept: LAB | Facility: CLINIC | Age: 68
End: 2023-02-13

## 2023-02-13 DIAGNOSIS — E78.2 MIXED HYPERLIPIDEMIA: ICD-10-CM

## 2023-02-13 LAB
ALBUMIN SERPL BCP-MCNC: 3.9 G/DL (ref 3.5–5)
ALP SERPL-CCNC: 45 U/L (ref 46–116)
ALT SERPL W P-5'-P-CCNC: 46 U/L (ref 12–78)
ANION GAP SERPL CALCULATED.3IONS-SCNC: 7 MMOL/L (ref 4–13)
AST SERPL W P-5'-P-CCNC: 36 U/L (ref 5–45)
BILIRUB SERPL-MCNC: 0.41 MG/DL (ref 0.2–1)
BUN SERPL-MCNC: 16 MG/DL (ref 5–25)
CALCIUM SERPL-MCNC: 9.6 MG/DL (ref 8.3–10.1)
CHLORIDE SERPL-SCNC: 108 MMOL/L (ref 96–108)
CHOLEST SERPL-MCNC: 183 MG/DL
CO2 SERPL-SCNC: 24 MMOL/L (ref 21–32)
CREAT SERPL-MCNC: 0.93 MG/DL (ref 0.6–1.3)
GFR SERPL CREATININE-BSD FRML MDRD: 82 ML/MIN/1.73SQ M
GLUCOSE P FAST SERPL-MCNC: 100 MG/DL (ref 65–99)
HDLC SERPL-MCNC: 68 MG/DL
LDLC SERPL CALC-MCNC: 69 MG/DL (ref 0–100)
NONHDLC SERPL-MCNC: 115 MG/DL
POTASSIUM SERPL-SCNC: 4.8 MMOL/L (ref 3.5–5.3)
PROT SERPL-MCNC: 7 G/DL (ref 6.4–8.4)
SODIUM SERPL-SCNC: 139 MMOL/L (ref 135–147)
TRIGL SERPL-MCNC: 232 MG/DL

## 2023-07-05 DIAGNOSIS — I25.10 CORONARY ARTERY DISEASE INVOLVING NATIVE CORONARY ARTERY OF NATIVE HEART WITHOUT ANGINA PECTORIS: ICD-10-CM

## 2023-07-05 RX ORDER — ROSUVASTATIN CALCIUM 10 MG/1
10 TABLET, COATED ORAL DAILY
Qty: 90 TABLET | Refills: 3 | Status: SHIPPED | OUTPATIENT
Start: 2023-07-05

## 2023-07-05 NOTE — TELEPHONE ENCOUNTER
Patient called to request prescription refill for rosuvastatin. He said that his cardiologist should be prescribing this for him.    He does have an appointment at the end of the month with [place], therefore, will send a 90-day supply to [facility][note 3] at patient's request.

## 2023-07-17 ENCOUNTER — OFFICE VISIT (OUTPATIENT)
Dept: CARDIOLOGY CLINIC | Facility: CLINIC | Age: 68
End: 2023-07-17
Payer: COMMERCIAL

## 2023-07-17 VITALS
HEIGHT: 70 IN | HEART RATE: 74 BPM | RESPIRATION RATE: 16 BRPM | WEIGHT: 246 LBS | SYSTOLIC BLOOD PRESSURE: 128 MMHG | DIASTOLIC BLOOD PRESSURE: 72 MMHG | BODY MASS INDEX: 35.22 KG/M2 | OXYGEN SATURATION: 94 %

## 2023-07-17 DIAGNOSIS — I25.10 CORONARY ARTERY DISEASE INVOLVING NATIVE CORONARY ARTERY OF NATIVE HEART WITHOUT ANGINA PECTORIS: Primary | ICD-10-CM

## 2023-07-17 DIAGNOSIS — E78.2 MIXED HYPERLIPIDEMIA: ICD-10-CM

## 2023-07-17 PROCEDURE — 99214 OFFICE O/P EST MOD 30 MIN: CPT | Performed by: NURSE PRACTITIONER

## 2023-07-17 PROCEDURE — 93000 ELECTROCARDIOGRAM COMPLETE: CPT | Performed by: NURSE PRACTITIONER

## 2023-07-17 RX ORDER — GABAPENTIN 300 MG/1
CAPSULE ORAL
COMMUNITY
Start: 2023-05-30

## 2023-07-17 RX ORDER — DULOXETIN HYDROCHLORIDE 60 MG/1
CAPSULE, DELAYED RELEASE ORAL
COMMUNITY
Start: 2023-07-10

## 2023-07-17 NOTE — ASSESSMENT & PLAN NOTE
S/p SURI to pRCA and D2 6/2009   Nonischemic Lolita Stein 1/2019 - has had exertional dyspnea since that time, but this remains unchanged    Continue asa, statin

## 2023-07-17 NOTE — PROGRESS NOTES
Patient ID: Jade Jo is a 77 y.o. male. Plan:      Coronary artery disease involving native coronary artery of native heart without angina pectoris  S/p SURI to pRCA and D2 6/2009   Nonischemic Kate Marques 1/2019 - has had exertional dyspnea since that time, but this remains unchanged    Continue asa, statin    Mixed hyperlipidemia  Continue Crestor 10 mg daily   Tolerating without issue       Follow up Plan/Summary Comments:  Darren Calvo is doing well from a cardiac perspective. I have not made any medication changes today. No indication for testing at this time. FLP 2/13/2023 is stable. I note that triglyceride levels are elevated. Can recheck FLP in the next few months and if triglycerides remain elevated consider additional treatment. Follow-up in 1 year or sooner if needed    HPI: Darren Calvo is seen in the office today for a routine visit. Darren Calvo has been doing well from a cardiac perspective. He denies any episodes of chest pain. He continues to experience exertional dyspnea, however this has been stable and unchanged. He continues to experience back pain and knee pain which limits his activity. He is trying to stay active riding bike and walking. His weight is stable and he reports following a better diet than he had been. Cardiac history is significant for drug-eluting stent to the proximal RCA and D2 6/2009. Review of Systems   10  point ROS  was otherwise non pertinent or negative except as per HPI or as below.    Gait: Normal      Most recent or relevant cardiac/vascular testing:      Results for orders placed or performed in visit on 07/17/23   POCT ECG    Impression    Sinus rhythm, RBBB, LAFB     NM stress 1/11/2019 normal, EF 56%         Objective:     /72 (BP Location: Left arm, Patient Position: Sitting, Cuff Size: Standard)   Pulse 74   Resp 16   Ht 5' 10" (1.778 m)   Wt 112 kg (246 lb)   SpO2 94%   BMI 35.30 kg/m²     PHYSICAL EXAM:    General:  Normal appearance, no acute distress  Eyes:  Anicteric. Oral mucosa:  Moist.  Neck:  No JVD. Carotid upstrokes are brisk without bruits. No masses. Chest:  Clear to auscultation   Cardiac:  No palpable PMI. Normal S1 and S2. No murmur gallop or rub. Abdomen:  Soft and nontender. No palpable organomegaly or aortic enlargement. Extremities: Trace bilateral lower extremity edema  Musculoskeletal:  Symmetric. Vascular:  Pedal pulses are intact. Neuro:  Grossly symmetric. Psych:  Alert and oriented x3.     No Known Allergies    Current Outpatient Medications:   •  aspirin 81 MG tablet, Take 81 mg by mouth daily , Disp: , Rfl:   •  Calcium-Magnesium-Vitamin D (CALCIUM MAGNESIUM PO), Take by mouth, Disp: , Rfl:   •  DULoxetine (CYMBALTA) 60 mg delayed release capsule, , Disp: , Rfl:   •  gabapentin (NEURONTIN) 300 mg capsule, , Disp: , Rfl:   •  Multiple Vitamin (MULTIVITAMIN) tablet, Take 1 tablet by mouth daily, Disp: , Rfl:   •  rosuvastatin (CRESTOR) 10 MG tablet, Take 1 tablet (10 mg total) by mouth daily, Disp: 90 tablet, Rfl: 3  •  tamsulosin (FLOMAX) 0.4 mg, Take 0.8 mg by mouth, Disp: , Rfl:   •  tiZANidine (ZANAFLEX) 4 mg tablet, Take 4 mg by mouth daily at bedtime (Patient not taking: Reported on 6/17/2022), Disp: , Rfl:   Past Medical History:   Diagnosis Date   • Coronary artery disease     s/p stenting   • History of epidural anesthesia     back L3/L4   • Hyperlipidemia    • Hypertension      Past Surgical History:   Procedure Laterality Date   • ANTERIOR FUSION LUMBAR SPINE     • BACK SURGERY     • CARPAL TUNNEL RELEASE     • CORONARY ANGIOPLASTY WITH STENT PLACEMENT  06/23/2009    SURI to 95% prox RCA and 99% D2.   • INGUINAL HERNIA REPAIR     • NASAL TURBINATE REDUCTION Bilateral 10/14/2019    with outfracture - office procedure - Dr. Roach Seat   • REPLACEMENT TOTAL KNEE     • SHOULDER ARTHROPLASTY         CMP:   Lab Results   Component Value Date    K 4.8 02/13/2023     02/13/2023    CO2 24 2023    BUN 16 2023    CREATININE 0.93 2023    EGFR 82 2023     Lipid Profile:    Lab Results   Component Value Date    TRIG 232 (H) 2023    HDL 68 2023         Social History     Tobacco Use   Smoking Status Former   • Types: Cigarettes   • Quit date:    • Years since quittin.5   Smokeless Tobacco Never

## 2023-10-24 ENCOUNTER — APPOINTMENT (OUTPATIENT)
Dept: LAB | Facility: CLINIC | Age: 68
End: 2023-10-24
Payer: COMMERCIAL

## 2023-10-24 DIAGNOSIS — E78.2 MIXED HYPERLIPIDEMIA: ICD-10-CM

## 2023-10-24 DIAGNOSIS — Z12.5 SPECIAL SCREENING FOR MALIGNANT NEOPLASM OF PROSTATE: ICD-10-CM

## 2023-10-24 LAB
ALBUMIN SERPL BCP-MCNC: 4.6 G/DL (ref 3.5–5)
ALP SERPL-CCNC: 47 U/L (ref 34–104)
ALT SERPL W P-5'-P-CCNC: 31 U/L (ref 7–52)
ANION GAP SERPL CALCULATED.3IONS-SCNC: 8 MMOL/L
AST SERPL W P-5'-P-CCNC: 31 U/L (ref 13–39)
BILIRUB SERPL-MCNC: 0.37 MG/DL (ref 0.2–1)
BUN SERPL-MCNC: 9 MG/DL (ref 5–25)
CALCIUM SERPL-MCNC: 10.1 MG/DL (ref 8.4–10.2)
CHLORIDE SERPL-SCNC: 102 MMOL/L (ref 96–108)
CHOLEST SERPL-MCNC: 197 MG/DL
CO2 SERPL-SCNC: 30 MMOL/L (ref 21–32)
CREAT SERPL-MCNC: 0.84 MG/DL (ref 0.6–1.3)
GFR SERPL CREATININE-BSD FRML MDRD: 88 ML/MIN/1.73SQ M
GLUCOSE P FAST SERPL-MCNC: 94 MG/DL (ref 65–99)
HDLC SERPL-MCNC: 74 MG/DL
LDLC SERPL CALC-MCNC: 97 MG/DL (ref 0–100)
NONHDLC SERPL-MCNC: 123 MG/DL
POTASSIUM SERPL-SCNC: 4.8 MMOL/L (ref 3.5–5.3)
PROT SERPL-MCNC: 7 G/DL (ref 6.4–8.4)
PSA SERPL-MCNC: 2.97 NG/ML (ref 0–4)
SODIUM SERPL-SCNC: 140 MMOL/L (ref 135–147)
TRIGL SERPL-MCNC: 132 MG/DL

## 2023-10-24 PROCEDURE — 80061 LIPID PANEL: CPT

## 2023-10-24 PROCEDURE — 36415 COLL VENOUS BLD VENIPUNCTURE: CPT

## 2023-10-24 PROCEDURE — G0103 PSA SCREENING: HCPCS

## 2023-10-24 PROCEDURE — 80053 COMPREHEN METABOLIC PANEL: CPT

## 2024-03-19 DIAGNOSIS — I25.10 CORONARY ARTERY DISEASE INVOLVING NATIVE CORONARY ARTERY OF NATIVE HEART WITHOUT ANGINA PECTORIS: ICD-10-CM

## 2024-03-19 RX ORDER — ROSUVASTATIN CALCIUM 10 MG/1
10 TABLET, COATED ORAL DAILY
Qty: 90 TABLET | Refills: 3 | Status: SHIPPED | OUTPATIENT
Start: 2024-03-19

## 2024-04-27 ENCOUNTER — APPOINTMENT (OUTPATIENT)
Dept: LAB | Facility: CLINIC | Age: 69
End: 2024-04-27
Payer: COMMERCIAL

## 2024-04-27 DIAGNOSIS — E78.5 HYPERLIPIDEMIA, UNSPECIFIED HYPERLIPIDEMIA TYPE: ICD-10-CM

## 2024-04-27 LAB
ALBUMIN SERPL BCP-MCNC: 4.6 G/DL (ref 3.5–5)
ALP SERPL-CCNC: 50 U/L (ref 34–104)
ALT SERPL W P-5'-P-CCNC: 28 U/L (ref 7–52)
ANION GAP SERPL CALCULATED.3IONS-SCNC: 9 MMOL/L (ref 4–13)
AST SERPL W P-5'-P-CCNC: 30 U/L (ref 13–39)
BILIRUB SERPL-MCNC: 0.46 MG/DL (ref 0.2–1)
BUN SERPL-MCNC: 14 MG/DL (ref 5–25)
CALCIUM SERPL-MCNC: 9.6 MG/DL (ref 8.4–10.2)
CHLORIDE SERPL-SCNC: 103 MMOL/L (ref 96–108)
CHOLEST SERPL-MCNC: 177 MG/DL
CO2 SERPL-SCNC: 27 MMOL/L (ref 21–32)
CREAT SERPL-MCNC: 0.81 MG/DL (ref 0.6–1.3)
GFR SERPL CREATININE-BSD FRML MDRD: 89 ML/MIN/1.73SQ M
GLUCOSE P FAST SERPL-MCNC: 106 MG/DL (ref 65–99)
HDLC SERPL-MCNC: 78 MG/DL
LDLC SERPL CALC-MCNC: 78 MG/DL (ref 0–100)
NONHDLC SERPL-MCNC: 99 MG/DL
POTASSIUM SERPL-SCNC: 4.5 MMOL/L (ref 3.5–5.3)
PROT SERPL-MCNC: 7.2 G/DL (ref 6.4–8.4)
SODIUM SERPL-SCNC: 139 MMOL/L (ref 135–147)
TRIGL SERPL-MCNC: 104 MG/DL

## 2024-04-27 PROCEDURE — 80053 COMPREHEN METABOLIC PANEL: CPT

## 2024-04-27 PROCEDURE — 80061 LIPID PANEL: CPT

## 2024-04-27 PROCEDURE — 36415 COLL VENOUS BLD VENIPUNCTURE: CPT

## 2024-05-10 ENCOUNTER — TELEPHONE (OUTPATIENT)
Dept: CARDIOLOGY CLINIC | Facility: CLINIC | Age: 69
End: 2024-05-10

## 2024-05-10 ENCOUNTER — OFFICE VISIT (OUTPATIENT)
Dept: CARDIOLOGY CLINIC | Facility: CLINIC | Age: 69
End: 2024-05-10
Payer: COMMERCIAL

## 2024-05-10 VITALS
DIASTOLIC BLOOD PRESSURE: 78 MMHG | HEART RATE: 62 BPM | HEIGHT: 70 IN | WEIGHT: 236 LBS | SYSTOLIC BLOOD PRESSURE: 120 MMHG | BODY MASS INDEX: 33.79 KG/M2

## 2024-05-10 DIAGNOSIS — I20.89 STABLE ANGINA: Primary | ICD-10-CM

## 2024-05-10 DIAGNOSIS — I25.10 CORONARY ARTERY DISEASE INVOLVING NATIVE CORONARY ARTERY OF NATIVE HEART WITHOUT ANGINA PECTORIS: Primary | ICD-10-CM

## 2024-05-10 DIAGNOSIS — I25.10 CORONARY ARTERY DISEASE INVOLVING NATIVE CORONARY ARTERY OF NATIVE HEART WITHOUT ANGINA PECTORIS: ICD-10-CM

## 2024-05-10 DIAGNOSIS — E78.2 MIXED HYPERLIPIDEMIA: ICD-10-CM

## 2024-05-10 PROCEDURE — 93000 ELECTROCARDIOGRAM COMPLETE: CPT | Performed by: NURSE PRACTITIONER

## 2024-05-10 PROCEDURE — 99214 OFFICE O/P EST MOD 30 MIN: CPT | Performed by: NURSE PRACTITIONER

## 2024-05-10 RX ORDER — DULOXETIN HYDROCHLORIDE 20 MG/1
20 CAPSULE, DELAYED RELEASE ORAL EVERY EVENING
COMMUNITY
Start: 2024-04-08

## 2024-05-10 RX ORDER — NITROGLYCERIN 0.4 MG/1
0.4 TABLET SUBLINGUAL
Qty: 25 TABLET | Refills: 3 | Status: SHIPPED | OUTPATIENT
Start: 2024-05-10

## 2024-05-10 NOTE — LETTER
2024       Carlo Samuel              : 1955        MRN: [id number]  [address] [facility] [id number]       Procedure Name: LEFT HEART CATHETERIZATION    Procedure date: 24    Location: [facility]  Address: [address] [place] [id number]      The hospital will contact you the day prior to your procedure, usually between 4PM - 6PM to instruct you on the time and place to report. If you do not hear from a [facility]  by 6PM the evening prior to your procedure, please contact the [facility] that you are scheduled at.      [facility]: [address] [place] [id number] - Short Stay Center [phone]     You may have nothing to eat or drink from midnight the night prior to your procedure. You may have a minimal amount of water with your morning medications. DO NOT stop taking Plavix or Aspirin unless advised otherwise.    If your procedure is scheduled after 12:00 noon, you may have clear liquids until 8:00AM the morning of your procedure. Clear liquids are 7UP, Ginger Ale, Jello or broth.    Arrange for a responsible person to drive you to and from the hospital.    Please shower/bathe the night before your procedure and do not use powders or lotions.    Please notify us if you have been admitted to the hospital within the past 30 days.    Bring a list of daily medications, vitamins, minerals, herbals and nutritional supplements you take. Include dosage and time you take them each day.    If packing an overnight bag, pack minimal clothing, you will be given hospital sleepwear. Do not bring money, valuables or jewelry. Wedding band is OK.    If you use CPAP machine, bring it to the hospital.      Have your Photo ID and Insurance cards with you.        DO NOT take any diabetic medication, including insulin, the morning of the procedure. Oral diabetic medications may include: Glucophage, Prandin, Glyburide, Micronase, Avandia, Glocovance, Precose,  "Glynase y Starlix.    You should continue to take your morning dose of heart and/or blood pressure medications with a sip of water UNLESS ADVISED OTHERWISE.    Special Instructions:    Medication holds:   N/A    Labs to be done on 5/14/24:  CBC (No fasting)         Alisson \"Eli\" Fredo  Surgery Coordinator  [facility]   [address] [place] [address]  Teams: [phone]                 "

## 2024-05-10 NOTE — TELEPHONE ENCOUNTER
"Left voicemail on machine informing patient to call and schedule a Left Heart Cath procedure.     Thanks,  Alisson \"Eli\" Fredo    "

## 2024-05-10 NOTE — PROGRESS NOTES
Patient ID: Carlo Samuel is a 69 y.o. male.        Plan:      Coronary artery disease involving native coronary artery of native heart without angina pectoris  S/p SURI to pRCA and D2 6/2009   Nonischemic Lexiscan 1/2019 - has had exertional dyspnea since that time, but this remains unchanged    Continue asa, statin    Mixed hyperlipidemia  Continue Crestor 10 mg daily   Tolerating without issue    Stable angina  Will arrange for cardiac catheterization  Prescription for nitro provided       Follow up Plan/Summary Comments:  Carlo symptoms sound concerning for stable angina.    We discussed the option for nuclear stress test and cardiac catheterization.  He would like to proceed with a cardiac catheterization and this will be arranged.    He was provided with a prescription for sublingual nitro and instructed on appropriate use.  I also told him that if he experiences any symptoms at rest that he needs to be evaluated emergently.    Follow-up in the office after cardiac catheterization    HPI: Carlo is seen in the office today for a problem visit.  Carlo is due for routine follow up in July, but requested an earlier appt due to sympotms of chest tightness and shortness of breath.  He was recently evaluated by his PCP, who recommended cardiac evaluation.    At the end of last summer, he noticed some changes in his breathing when cutting the grass.  Initially this was attributed to allergies and being overweight.    He began an exercise routine in an attempt to lose weight and has successfully lost about 25 pounds.  He rides a recumbent stationary bike each morning and has not experienced symptoms with this.    He does however experience symptoms with activities of higher intensity such as when riding his e-bike and mowing the lawn.    He experiences a tightness in his throat and gets short of breath more quickly.  The symptoms improve quickly with rest.  He denies any symptoms at rest.      Review of Systems  "  10  point ROS  was otherwise non pertinent or negative except as per HPI or as below.   Gait: Normal      Most recent or relevant cardiac/vascular testing:    NM stress 1/11/2019 normal, EF 56%     Results for orders placed or performed in visit on 05/10/24   POCT ECG    Impression    Normal sinus rhythm, right bundle branch block, LAFB  When compared to tracing from 7/18/2023, there is no significant change             Objective:     /78   Pulse 62   Ht 5' 10\" (1.778 m)   Wt 107 kg (236 lb)   BMI 33.86 kg/m²     PHYSICAL EXAM:    General:  Normal appearance, no acute distress  Eyes:  Anicteric.  Oral mucosa:  Moist.  Neck:  No JVD. Carotid upstrokes are brisk without bruits.  No masses.  Chest:  Clear to auscultation   Cardiac:  No palpable PMI.  Normal S1 and S2.  No murmur gallop or rub.  Abdomen:  Soft and nontender. No palpable organomegaly or aortic enlargement.  Extremities:  No peripheral edema.  Musculoskeletal:  Symmetric.   Vascular:  Pedal pulses are intact.  Neuro:  Grossly symmetric.  Psych:  Alert and oriented x3.    No Known Allergies    Current Outpatient Medications:     aspirin 81 MG tablet, Take 81 mg by mouth daily , Disp: , Rfl:     Calcium-Magnesium-Vitamin D (CALCIUM MAGNESIUM PO), Take by mouth, Disp: , Rfl:     DULoxetine (CYMBALTA) 20 mg capsule, Take 20 mg by mouth every evening, Disp: , Rfl:     DULoxetine (CYMBALTA) 60 mg delayed release capsule, Take 60 mg by mouth daily, Disp: , Rfl:     gabapentin (NEURONTIN) 300 mg capsule, Take 300 mg by mouth 3 (three) times a day, Disp: , Rfl:     Multiple Vitamin (MULTIVITAMIN) tablet, Take 1 tablet by mouth daily, Disp: , Rfl:     nitroglycerin (NITROSTAT) 0.4 mg SL tablet, Place 1 tablet (0.4 mg total) under the tongue every 5 (five) minutes as needed for chest pain, Disp: 25 tablet, Rfl: 3    rosuvastatin (CRESTOR) 10 MG tablet, Take 1 tablet (10 mg total) by mouth daily, Disp: 90 tablet, Rfl: 3    tamsulosin (FLOMAX) 0.4 mg, Take " 0.8 mg by mouth daily with dinner, Disp: , Rfl:     tiZANidine (ZANAFLEX) 4 mg tablet, Take 4 mg by mouth daily at bedtime (Patient not taking: Reported on 2022), Disp: , Rfl:   Past Medical History:   Diagnosis Date    Coronary artery disease     s/p stenting    History of epidural anesthesia     back L3/L4    Hyperlipidemia     Hypertension      Past Surgical History:   Procedure Laterality Date    ANTERIOR FUSION LUMBAR SPINE      BACK SURGERY      CARPAL TUNNEL RELEASE      CORONARY ANGIOPLASTY WITH STENT PLACEMENT  2009    SURI to 95% prox RCA and 99% D2.    INGUINAL HERNIA REPAIR      NASAL TURBINATE REDUCTION Bilateral 10/14/2019    with outfracture - office procedure - Dr. Chapman    REPLACEMENT TOTAL KNEE      SHOULDER ARTHROPLASTY         CMP:   Lab Results   Component Value Date    K 4.5 2024     2024    CO2 27 2024    BUN 14 2024    CREATININE 0.81 2024    EGFR 89 2024     Lipid Profile:    Lab Results   Component Value Date    TRIG 104 2024    HDL 78 2024         Social History     Tobacco Use   Smoking Status Former    Current packs/day: 0.00    Types: Cigarettes    Quit date:     Years since quittin.3   Smokeless Tobacco Never

## 2024-05-10 NOTE — ASSESSMENT & PLAN NOTE
S/p SURI to pRCA and D2 6/2009   Nonischemic Lexiscan 1/2019 - has had exertional dyspnea since that time, but this remains unchanged    Continue asa, statin

## 2024-05-13 NOTE — TELEPHONE ENCOUNTER
"Patient scheduled for Left Heart Cath on 5/23/24 at [facility] with Dr. Avendano.      Mailed patient instructions.     Patient aware of all general instructions.    Medication holds:   N/A    Labs to be done on 5/14/24:  CBC (No fasting)   (Patient did CMP on 4/27/24)           Thank you,  Alisson \"Eli\" Fredo      "

## 2024-05-16 NOTE — TELEPHONE ENCOUNTER
Neil Torres, this request for a [facility]/[id number] has been denied by [employer].  They are requesting a Peer to Peer.  They must be scheduled.      Please call [phone], opt #4 and you will reference case # [id number].       This case was started under Dr. Avendano.  NPI: [id number]

## 2024-05-17 ENCOUNTER — APPOINTMENT (OUTPATIENT)
Dept: LAB | Facility: CLINIC | Age: 69
End: 2024-05-17
Payer: COMMERCIAL

## 2024-05-17 ENCOUNTER — TELEPHONE (OUTPATIENT)
Age: 69
End: 2024-05-17

## 2024-05-17 LAB
BASOPHILS # BLD AUTO: 0.04 THOUSANDS/ÂΜL (ref 0–0.1)
BASOPHILS NFR BLD AUTO: 1 % (ref 0–1)
EOSINOPHIL # BLD AUTO: 0.23 THOUSAND/ÂΜL (ref 0–0.61)
EOSINOPHIL NFR BLD AUTO: 3 % (ref 0–6)
ERYTHROCYTE [DISTWIDTH] IN BLOOD BY AUTOMATED COUNT: 15 % (ref 11.6–15.1)
HCT VFR BLD AUTO: 45.1 % (ref 36.5–49.3)
HGB BLD-MCNC: 13.9 G/DL (ref 12–17)
IMM GRANULOCYTES # BLD AUTO: 0.02 THOUSAND/UL (ref 0–0.2)
IMM GRANULOCYTES NFR BLD AUTO: 0 % (ref 0–2)
LYMPHOCYTES # BLD AUTO: 1.51 THOUSANDS/ÂΜL (ref 0.6–4.47)
LYMPHOCYTES NFR BLD AUTO: 21 % (ref 14–44)
MCH RBC QN AUTO: 27.8 PG (ref 26.8–34.3)
MCHC RBC AUTO-ENTMCNC: 30.8 G/DL (ref 31.4–37.4)
MCV RBC AUTO: 90 FL (ref 82–98)
MONOCYTES # BLD AUTO: 0.66 THOUSAND/ÂΜL (ref 0.17–1.22)
MONOCYTES NFR BLD AUTO: 9 % (ref 4–12)
NEUTROPHILS # BLD AUTO: 4.83 THOUSANDS/ÂΜL (ref 1.85–7.62)
NEUTS SEG NFR BLD AUTO: 66 % (ref 43–75)
NRBC BLD AUTO-RTO: 0 /100 WBCS
PLATELET # BLD AUTO: 243 THOUSANDS/UL (ref 149–390)
PMV BLD AUTO: 10.4 FL (ref 8.9–12.7)
RBC # BLD AUTO: 5 MILLION/UL (ref 3.88–5.62)
WBC # BLD AUTO: 7.29 THOUSAND/UL (ref 4.31–10.16)

## 2024-05-17 PROCEDURE — 85025 COMPLETE CBC W/AUTO DIFF WBC: CPT

## 2024-05-17 PROCEDURE — 36415 COLL VENOUS BLD VENIPUNCTURE: CPT

## 2024-05-17 NOTE — TELEPHONE ENCOUNTER
Jaymie from  lab calls.  She saw a BMP for a pre-procedure pending order and asked if this needed to be done.  Advised I do not see that ordered, only a CBC.      If patient needed any other labs for cath please reach out to  lab in [place].

## 2024-05-23 ENCOUNTER — HOSPITAL ENCOUNTER (OUTPATIENT)
Facility: HOSPITAL | Age: 69
Setting detail: OUTPATIENT SURGERY
Discharge: HOME | End: 2024-05-23
Attending: INTERNAL MEDICINE | Admitting: INTERNAL MEDICINE
Payer: COMMERCIAL

## 2024-05-23 ENCOUNTER — TELEPHONE (OUTPATIENT)
Dept: CARDIAC REHAB | Facility: HOSPITAL | Age: 69
End: 2024-05-23

## 2024-05-23 ENCOUNTER — APPOINTMENT (OUTPATIENT)
Dept: NON INVASIVE DIAGNOSTICS | Facility: HOSPITAL | Age: 69
End: 2024-05-23
Payer: COMMERCIAL

## 2024-05-23 VITALS
TEMPERATURE: 97 F | HEART RATE: 57 BPM | BODY MASS INDEX: 34.96 KG/M2 | RESPIRATION RATE: 17 BRPM | SYSTOLIC BLOOD PRESSURE: 95 MMHG | OXYGEN SATURATION: 92 % | WEIGHT: 236 LBS | DIASTOLIC BLOOD PRESSURE: 54 MMHG | HEIGHT: 69 IN

## 2024-05-23 DIAGNOSIS — I25.10 CORONARY ARTERY DISEASE INVOLVING NATIVE CORONARY ARTERY OF NATIVE HEART WITHOUT ANGINA PECTORIS: Primary | ICD-10-CM

## 2024-05-23 DIAGNOSIS — I20.89 STABLE ANGINA: ICD-10-CM

## 2024-05-23 LAB
AORTIC ROOT: 4.1 CM
APICAL FOUR CHAMBER EJECTION FRACTION: 49 %
ATRIAL RATE: 49 BPM
ATRIAL RATE: 66 BPM
AV LVOT MEAN GRADIENT: 2 MMHG
AV LVOT PEAK GRADIENT: 4 MMHG
BSA FOR ECHO PROCEDURE: 2.22 M2
DOP CALC LVOT PEAK VEL VTI: 21.6 CM
DOP CALC LVOT PEAK VEL: 0.96 M/S
E WAVE DECELERATION TIME: 232 MS
E/A RATIO: 1.14
FRACTIONAL SHORTENING: 17 (ref 28–44)
INTERVENTRICULAR SEPTUM IN DIASTOLE (PARASTERNAL SHORT AXIS VIEW): 1.4 CM
INTERVENTRICULAR SEPTUM: 1.4 CM (ref 0.6–1.1)
KCT BLD-ACNC: 289 SEC (ref 89–137)
LAAS-AP2: 24.6 CM2
LAAS-AP4: 15 CM2
LEFT ATRIUM SIZE: 3.5 CM
LEFT ATRIUM VOLUME (MOD BIPLANE): 51 ML
LEFT ATRIUM VOLUME INDEX (MOD BIPLANE): 23 ML/M2
LEFT INTERNAL DIMENSION IN SYSTOLE: 4.5 CM (ref 2.1–4)
LEFT VENTRICULAR INTERNAL DIMENSION IN DIASTOLE: 5.4 CM (ref 3.5–6)
LEFT VENTRICULAR POSTERIOR WALL IN END DIASTOLE: 1.4 CM
LEFT VENTRICULAR STROKE VOLUME: 50 ML
LVSV (TEICH): 50 ML
MV E'TISSUE VEL-SEP: 8 CM/S
MV PEAK A VEL: 0.81 M/S
MV PEAK E VEL: 92 CM/S
MV STENOSIS PRESSURE HALF TIME: 67 MS
MV VALVE AREA P 1/2 METHOD: 3.28
P AXIS: 29 DEGREES
P AXIS: 59 DEGREES
PR INTERVAL: 140 MS
PR INTERVAL: 172 MS
QRS AXIS: -45 DEGREES
QRS AXIS: -70 DEGREES
QRSD INTERVAL: 116 MS
QRSD INTERVAL: 142 MS
QT INTERVAL: 428 MS
QT INTERVAL: 454 MS
QTC INTERVAL: 410 MS
QTC INTERVAL: 448 MS
RIGHT ATRIUM AREA SYSTOLE A4C: 16.6 CM2
RIGHT VENTRICLE ID DIMENSION: 4.4 CM
SL CV LEFT ATRIUM LENGTH A2C: 5.9 CM
SL CV LV EF: 49
SL CV PED ECHO LEFT VENTRICLE DIASTOLIC VOLUME (MOD BIPLANE) 2D: 140 ML
SL CV PED ECHO LEFT VENTRICLE SYSTOLIC VOLUME (MOD BIPLANE) 2D: 90 ML
SPECIMEN SOURCE: ABNORMAL
T WAVE AXIS: 27 DEGREES
T WAVE AXIS: 35 DEGREES
TR MAX PG: 17 MMHG
TR PEAK VELOCITY: 2.1 M/S
TRICUSPID ANNULAR PLANE SYSTOLIC EXCURSION: 2 CM
TRICUSPID VALVE PEAK REGURGITATION VELOCITY: 2.09 M/S
VENTRICULAR RATE: 49 BPM
VENTRICULAR RATE: 66 BPM

## 2024-05-23 PROCEDURE — 93306 TTE W/DOPPLER COMPLETE: CPT | Performed by: INTERNAL MEDICINE

## 2024-05-23 PROCEDURE — C1769 GUIDE WIRE: HCPCS | Performed by: INTERNAL MEDICINE

## 2024-05-23 PROCEDURE — 93799 UNLISTED CV SVC/PROCEDURE: CPT | Performed by: INTERNAL MEDICINE

## 2024-05-23 PROCEDURE — 85347 COAGULATION TIME ACTIVATED: CPT

## 2024-05-23 PROCEDURE — 93005 ELECTROCARDIOGRAM TRACING: CPT

## 2024-05-23 PROCEDURE — C1887 CATHETER, GUIDING: HCPCS | Performed by: INTERNAL MEDICINE

## 2024-05-23 PROCEDURE — 93010 ELECTROCARDIOGRAM REPORT: CPT | Performed by: INTERNAL MEDICINE

## 2024-05-23 PROCEDURE — C1894 INTRO/SHEATH, NON-LASER: HCPCS | Performed by: INTERNAL MEDICINE

## 2024-05-23 PROCEDURE — 93458 L HRT ARTERY/VENTRICLE ANGIO: CPT | Performed by: INTERNAL MEDICINE

## 2024-05-23 PROCEDURE — 99152 MOD SED SAME PHYS/QHP 5/>YRS: CPT | Performed by: INTERNAL MEDICINE

## 2024-05-23 PROCEDURE — 93306 TTE W/DOPPLER COMPLETE: CPT

## 2024-05-23 PROCEDURE — 99153 MOD SED SAME PHYS/QHP EA: CPT | Performed by: INTERNAL MEDICINE

## 2024-05-23 PROCEDURE — 93571 IV DOP VEL&/PRESS C FLO 1ST: CPT | Performed by: INTERNAL MEDICINE

## 2024-05-23 RX ORDER — ACETAMINOPHEN 325 MG/1
975 TABLET ORAL ONCE AS NEEDED
Status: DISCONTINUED | OUTPATIENT
Start: 2024-05-23 | End: 2024-05-23 | Stop reason: HOSPADM

## 2024-05-23 RX ORDER — SODIUM CHLORIDE 9 MG/ML
125 INJECTION, SOLUTION INTRAVENOUS CONTINUOUS
Status: DISPENSED | OUTPATIENT
Start: 2024-05-23 | End: 2024-05-23

## 2024-05-23 RX ORDER — HEPARIN SODIUM 1000 [USP'U]/ML
INJECTION, SOLUTION INTRAVENOUS; SUBCUTANEOUS CODE/TRAUMA/SEDATION MEDICATION
Status: DISCONTINUED | OUTPATIENT
Start: 2024-05-23 | End: 2024-05-23 | Stop reason: HOSPADM

## 2024-05-23 RX ORDER — AMLODIPINE BESYLATE 5 MG/1
5 TABLET ORAL DAILY
Qty: 90 TABLET | Refills: 3 | Status: SHIPPED | OUTPATIENT
Start: 2024-05-23

## 2024-05-23 RX ORDER — MIDAZOLAM HYDROCHLORIDE 2 MG/2ML
INJECTION, SOLUTION INTRAMUSCULAR; INTRAVENOUS CODE/TRAUMA/SEDATION MEDICATION
Status: DISCONTINUED | OUTPATIENT
Start: 2024-05-23 | End: 2024-05-23 | Stop reason: HOSPADM

## 2024-05-23 RX ORDER — NITROGLYCERIN 20 MG/100ML
INJECTION INTRAVENOUS CODE/TRAUMA/SEDATION MEDICATION
Status: DISCONTINUED | OUTPATIENT
Start: 2024-05-23 | End: 2024-05-23 | Stop reason: HOSPADM

## 2024-05-23 RX ORDER — ROSUVASTATIN CALCIUM 40 MG/1
40 TABLET, COATED ORAL DAILY
Qty: 90 TABLET | Refills: 3 | Status: SHIPPED | OUTPATIENT
Start: 2024-05-23

## 2024-05-23 RX ORDER — ASPIRIN 81 MG/1
324 TABLET, CHEWABLE ORAL ONCE
Status: COMPLETED | OUTPATIENT
Start: 2024-05-23 | End: 2024-05-23

## 2024-05-23 RX ORDER — VERAPAMIL HYDROCHLORIDE 2.5 MG/ML
INJECTION, SOLUTION INTRAVENOUS CODE/TRAUMA/SEDATION MEDICATION
Status: DISCONTINUED | OUTPATIENT
Start: 2024-05-23 | End: 2024-05-23 | Stop reason: HOSPADM

## 2024-05-23 RX ORDER — NITROGLYCERIN 0.4 MG/1
0.4 TABLET SUBLINGUAL ONCE AS NEEDED
Status: DISCONTINUED | OUTPATIENT
Start: 2024-05-23 | End: 2024-05-23 | Stop reason: HOSPADM

## 2024-05-23 RX ORDER — SODIUM CHLORIDE 9 MG/ML
125 INJECTION, SOLUTION INTRAVENOUS CONTINUOUS
Status: DISCONTINUED | OUTPATIENT
Start: 2024-05-23 | End: 2024-05-23

## 2024-05-23 RX ORDER — METOPROLOL SUCCINATE 25 MG/1
25 TABLET, EXTENDED RELEASE ORAL DAILY
Qty: 90 TABLET | Refills: 3 | Status: SHIPPED | OUTPATIENT
Start: 2024-05-23 | End: 2024-05-23

## 2024-05-23 RX ORDER — ONDANSETRON 2 MG/ML
4 INJECTION INTRAMUSCULAR; INTRAVENOUS ONCE AS NEEDED
Status: DISCONTINUED | OUTPATIENT
Start: 2024-05-23 | End: 2024-05-23 | Stop reason: HOSPADM

## 2024-05-23 RX ORDER — ISOSORBIDE MONONITRATE 30 MG/1
30 TABLET, EXTENDED RELEASE ORAL DAILY
Qty: 90 TABLET | Refills: 3 | Status: SHIPPED | OUTPATIENT
Start: 2024-05-23 | End: 2024-05-23

## 2024-05-23 RX ORDER — FENTANYL CITRATE 50 UG/ML
INJECTION, SOLUTION INTRAMUSCULAR; INTRAVENOUS CODE/TRAUMA/SEDATION MEDICATION
Status: DISCONTINUED | OUTPATIENT
Start: 2024-05-23 | End: 2024-05-23 | Stop reason: HOSPADM

## 2024-05-23 RX ORDER — LIDOCAINE HYDROCHLORIDE 10 MG/ML
INJECTION, SOLUTION EPIDURAL; INFILTRATION; INTRACAUDAL; PERINEURAL CODE/TRAUMA/SEDATION MEDICATION
Status: DISCONTINUED | OUTPATIENT
Start: 2024-05-23 | End: 2024-05-23 | Stop reason: HOSPADM

## 2024-05-23 RX ORDER — METOPROLOL SUCCINATE 25 MG/1
25 TABLET, EXTENDED RELEASE ORAL DAILY
Qty: 90 TABLET | Refills: 3 | Status: SHIPPED | OUTPATIENT
Start: 2024-05-23

## 2024-05-23 RX ADMIN — ASPIRIN 81 MG CHEWABLE TABLET 324 MG: 81 TABLET CHEWABLE at 06:58

## 2024-05-23 RX ADMIN — SODIUM CHLORIDE 125 ML/HR: 0.9 INJECTION, SOLUTION INTRAVENOUS at 06:58

## 2024-05-23 NOTE — DISCHARGE INSTR - AVS FIRST PAGE
1. Please see the post cardiac catheterization dishcarge instructions.   No heavy lifting, greater than 10 lbs. or strenuous  activity for 48 hrs.    2.Remove band aid tomorrow.  Shower and wash area- wrist gently with soap and water- beginning tomorrow. Rinse and pat dry.  Apply new water seal band aid.  Repeat this process for 5 days. No powders, creams lotions or antibiotic ointments  for 5 days.  No tub baths, hot tubs or swimming for 5 days.     3. Please call our office ([phone]) if you have any fever, redness, swelling, discharge from your wrist access site.    4.No driving for 1 day

## 2024-05-23 NOTE — INTERVAL H&P NOTE
"Update: (This section must be completed if the H&P was completed greater than 24 hrs to procedure or admission)    H&P reviewed. After examining the patient, I find no changed to the H&P since it had been written.      /67 (BP Location: Right arm)   Pulse 73   Temp (!) 97.4 °F (36.3 °C) (Temporal)   Resp 16   Ht 5' 9\" (1.753 m)   Wt 107 kg (236 lb)   SpO2 94%   BMI 34.85 kg/m²     Carlo HERNADEZ Jimmie,, a 69 year old patient, presents to [facility] for evaluation of exertional chest pain.       Patient re-evaluated. Accept as history and physical.    JESSICA Vogel/May 23, 2024/7:43 AM  "

## 2024-05-23 NOTE — TELEPHONE ENCOUNTER
Received a call from Dr. Avendano to get patient scheduled for CR. Patient is scheduled for CR evaluation on 5/31/2024.

## 2024-05-23 NOTE — Clinical Note
Medication given was versed. The indication was Sedation. [FreeTextEntry2] : 9-year-old-year-old presents today as referred by the pediatrician concern for an elevated hemoglobin A1c and concern for prediabetes.  On labs done January 21, 2021 hemoglobin A1c registered at 5.7% which is considered to be at risk for diabetes.  Of note the AST was elevated at 59 and an ALT was elevated at 94.  In addition triglycerides were elevated at 163.  Blood sugar on metabolic panel was 79.  Patient denies any polyuria or polydipsia.  Mom has yet to meet with a nutritionist but would like to do so.

## 2024-05-24 LAB
ATRIAL RATE: 62 BPM
P AXIS: 14 DEGREES
PR INTERVAL: 224 MS
QRS AXIS: -3 DEGREES
QRSD INTERVAL: 82 MS
QT INTERVAL: 488 MS
QTC INTERVAL: 495 MS
T WAVE AXIS: -18 DEGREES
VENTRICULAR RATE: 62 BPM

## 2024-05-30 NOTE — PROGRESS NOTES
CARDIAC REHABILITATION ASSESSMENT AND INDIVIDUALIZED TREATMENT PLAN  INITIAL           Today's date: 2024   # of Exercise Sessions Completed: 1  Patient name: Carlo Samuel      : 1955  Age: 69 y.o.       MRN: [id number]  Referring Physician: Jenifer Avendano DO  Cardiologist: JESSICA Kinsey (Chuy Rajput MD)  Provider: [place]  Clinician: Erika Sacks, MS Dr. Markson,   Please review the following patient assessment for Carlo to begin cardiac rehabilitation post cardiac cath.  Please verify you agree with the outlined plan of care and exercise prescription by signing with attached order.    Thank You.      Comments: Patient requires skilled CR to achieve goals and maximum functional capacity. CR medically necessary for secondary prevention.         Dx: S/P Cardiac Cath    Description of Diagnosis: Cardiac catheterization w/ no intervention. Mid LAD 65% stenosed, Left Cx 40% stenosed, and Prox RCA 10% stenoses    Date of onset: 2024    Other Cardiac History: HTN, HLD, SURI to RCA in           ASSESSMENT    Medical History:   Past Medical History:   Diagnosis Date    Coronary artery disease     s/p stenting    History of epidural anesthesia     back L3/L4    Hyperlipidemia     Hypertension        Family History:  Family History   Problem Relation Age of Onset    Rheumatic fever Father     Heart disease Father     Cancer Father     Lung cancer Father        Allergies:   Patient has no known allergies.    Current Medications:   Current Outpatient Medications   Medication Sig Dispense Refill    amLODIPine (NORVASC) 5 mg tablet Take 1 tablet (5 mg total) by mouth daily 90 tablet 3    aspirin 81 MG tablet Take 81 mg by mouth daily       Calcium-Magnesium-Vitamin D (CALCIUM MAGNESIUM PO) Take by mouth      DULoxetine (CYMBALTA) 20 mg capsule Take 20 mg by mouth every evening      DULoxetine (CYMBALTA) 60 mg delayed release capsule Take 60 mg by mouth daily      gabapentin  (NEURONTIN) 300 mg capsule Take 300 mg by mouth 3 (three) times a day      metoprolol succinate (TOPROL-XL) 25 mg 24 hr tablet Take 1 tablet (25 mg total) by mouth daily 90 tablet 3    Multiple Vitamin (MULTIVITAMIN) tablet Take 1 tablet by mouth daily      nitroglycerin (NITROSTAT) 0.4 mg SL tablet Place 1 tablet (0.4 mg total) under the tongue every 5 (five) minutes as needed for chest pain 25 tablet 3    rosuvastatin (CRESTOR) 40 MG tablet Take 1 tablet (40 mg total) by mouth daily 90 tablet 3    tamsulosin (FLOMAX) 0.4 mg Take 0.8 mg by mouth daily with dinner      tiZANidine (ZANAFLEX) 4 mg tablet Take 4 mg by mouth daily at bedtime       No current facility-administered medications for this visit.       Medication compliance: Yes   Comments: Pt reports to be compliant with medications    Physical Limitations: BLE neuropathy, R knee replacement, SOB    Fall Risk: Low   Comments: Ambulates with a steady gait with no assist device    Cultural needs: None      CAD Risk Factors:  Cholesterol: Yes (Chart states HLD but last lipid panel WNL)  HTN: Yes  DM: No  Obesity: Yes (BMI 34.85 kg/m2)  Inactivity: Yes      EXERCISE ASSESSMENT:    Initial Fitness Assessment:    Rest: HR 57, /66, SpO2 94%, Asx  Exercise: , /68, SpO2 97%, Asx, 1.84 METs    Reason for Discontinuation: Heart rate response and RPE 6  Recovery: HR 66, /60, SpO2 98%, Asx  ECG Interpretation: NSR      Functional Status Prior to Diagnosis for Treatment:   Occupation: full time job,  at [email or web address]  Recreation/Physical Activity: Spend time outdoors  ADL’s: No limitations  [place]: No limitations  Home exercise equipment: Stationary bike  Home exercise: Ebike  Other: None    Current Functional Status:  Occupation: full time job,  at [email or web address]  Recreation/Physical Activity: Spend time outdoors  ADL’s:limited by Chest Pain  [place]: limited by Chest Pain  Home exercise: Has not  "resumed  Other: None    Functional Capacity Screening Tool:  [facility] Activity Status Index:  4.64 METs      PSYCHOSOCIAL ASSESSMENT:    Depression screening:  PHQ-9 = 7    Interpretation:  5-9 = Mild Depression  Anxiety screening:  MARINO-7 = 10    Interpretation: 10-14 = Moderate anxiety    Pt self-report of depression and anxiety   Patient reports they are coping well with good social support and denies depression or anxiety      Self-reported stress level:  10   Stressors:  Ongoing health issues, return to work  Stress Management Tools: practice Relaxation Techniques and keep a positive mindset    Quality of Life Screen:  (Higher score indicates disease impact on QOL)  [facility] COOP score: 31/40        Social Support:   spouse and friends  Community/Social Activities: Spend time with family and friends     Psychosocial Assessment as it relates to rehabilitation:   Patient denies issues with his/her family or home life that may affect their rehabilitation efforts.       NUTRITION ASSESSMENT:    Weight:    Wt Readings from Last 1 Encounters:   05/23/24 107 kg (236 lb)        Height:   Ht Readings from Last 1 Encounters:   05/23/24 5' 9\" (1.753 m)       Rate Your Plate Score: 42/81    Diabetes: N/A  A1c: No A1c in EPIC    Lipid management: Discussed diet and lipid management and Last lipid profile 4/27/2024  Chol 177    HDL 78  LDL 78    Current Dietary Habits:  Patient states he has removed sugar and sweets from his diet. Patient states he switched from table salt to sea salt but still doesn't use it often.     Drug/Alcohol Use:   Occasionally drinks Bud Lite      OTHER CORE COMPONENT ASSESSMENT:    Tobacco Use:     Pt quit in 2003   and has abstained    Anginal Symptoms:  chest pressure and SOB   NTG use: Compliant with carrying NTG, Understands proper use, and Reviewed Proper use        INDIVIDUALIZED TREATMENT PLAN      Patient will attend 35 monitored exercise sessions beginning 6/3/2024.    See outlined plan " of care below for specific patient goals in each component of care.        EXERCISE GOAL and PLAN      SMART Goals:   10% improvement in functional capacity based on max METs achieved in initial fitness assessment  improved DASI score by 10%  increased exercise capacity by 40% based on peak METs tolerated in cardiac rehab exercise session  maintain > 150 minutes per week of moderate intensity exercise    Patient Specific EXERCISE GOALS:       resume ADLs with increased strength, return to work unrestricted, attend rehab regularly, and return to regular exercise regimen    Progress toward SMART and personal Exercise goals: Patient just set goals at initial evaluation. Patient will be progressed towards goals as tolerated. Patient is agreeable to attend cardiopulmonary rehab exercise sessions 3x/wk x 36 sessions.    Plan for next 30 days:    Attend CR 2-3x/week, resume light exercise program at home, increase daily walking    The patient was counseled on exercise guidelines to achieve a minimum of 150 mins/wk of moderate intensity (RPE 4-6)   exercise and encouraged to add 1-2 days of exercise on opposite days of cardiac rehab as tolerated.     Current Aerobic Exercise Prescription:      Frequency: 2-3 days/week   Supplement with home exercise 2+ days/wk as tolerated       Minutes: 25-35         METS: 1.5-2.5            HR: RHR +30-40bpm   RPE: 4-6         Modalities: Treadmill, UBE, NuStep, and Recumbent bike     Exercise workloads will be progressed gradually as tolerated, within limits of patient's ability, and according to the patient's   response to the exercise program.      Aerobic Exercise Prescription - 30 day goal:   Frequency: 2-3 days/week of cardiac rehab       Supplement with home exercise 2+ days/wk as tolerated    Minutes: 35-45       >150 mins/wk of moderate intensity exercise   METS: 2.5-3.5   HR: RHR +30-40bpm     RPE: 4-6   Modalities: Treadmill, UBE, NuStep, and Recumbent bike    Strength  training:  CR department is currently w/o strength equipment. Patient will be introduced to strength training at a later date when equipment returns.     Home Exercise: Has not returned to his HEP yet    Group and Individual Education: benefit of exercise for CAD risk factors, home exercise guidelines, AHA guidelines to achieve >150 mins/wk of moderate exercise, RPE scale, and Group class: Risk Factors for Heart Disease     Readiness to change: Preparation:  (Getting ready to change)       NUTRITION GOALS AND PLAN    Weight control:    Starting weight: 236   Current weight:     Nutritional   Reviewed patient's Rate your Plate. Discussed key elements of heart healthy eating. Reviewed patient goals for dietary modifications and their clinical implications.  Reviewed most recent lipid profile.     SMART Goals:   reduced BMI to < 25, fasting BG , Improved Rate Your Plate score  >58, and 2.5-5%  wt loss    Patient Specific NUTRITION GOALS:     Weight loss, improve diet    Patient's progress toward SMART and personal Nutrition goals:   Patient just set goals at initial evaluation. Patient will be progressed towards goals as tolerated. Patient is agreeable to making dietary modifications.    Plan for next 30 days:   Patient will receive educational handouts on healthy eating and food label reading. He will begin making changes to his dietary habits.     Measurable goals were based Rate Your Plate Dietary Self-Assessment. These are the areas in which the patient could score higher on the assessment.  Goals include recommendations for a heart healthy diet based on American Heart Association.    Group and Individual Education:   heart healthy eating principles  low sodium diet  maintaining hydration  group class: Heart Healthy Eating  group class:  Label Reading    Readiness to change: Preparation:  (Getting ready to change)       PSYCHOSOCIAL ASSESSMENT AND PLAN    Psychosocial Assessment as it relates to  rehabilitation:   Patient denies issues with his/her family or home life that may affect their rehabilitation efforts.     SMART Goals:     Reduce perceived stress to 1-3/10, improved [facility] QOL < 27, feel less tired with more energy, Improved appetite control, and take time to relax    Patient Specific PSYCHOCOSOCIAL GOALS:     spend time with family, reduce stress, relax more    Patient's progress toward SMART and personal Psychosocial goals:   Patient just set goals at initial evaluation. Patient will be progressed towards goals as tolerated.    Plan for next 30 days:   Class: Stress and Your Health, Spend time outdoors, Keep a positive mindset, and Enjoy family    Group and Individual Education: stress management techniques, benefits of enrolling in [employer], depression and CAD, and class:  Stress and Your Health     Information to utilize [employer] was provided as well as contact information for counseling through  Behavioral [facility] and group psychotherapy groups available.    Readiness to change: Preparation:  (Getting ready to change)       OTHER CORE COMPONENTS GOALS and PLAN      Blood Pressure will be monitored throughout the program and cardiologist will be notified of elevated trends.    Pt will be encouraged to monitor home BP if advised by cardiologist.    Tobacco Intervention:   Pt quit in 2003 and has abstained since quitting.      SMART Goals:   consistent, controlled resting BP < 130/80, reduced angina, medication compliance, and abstain from smoking    Patient Specific CORE COMPONENT GOALS:    reduced dietary sodium <2000mg, medication compliance, reduce number of medications  needed for BP control, and Abstain from smoking    Progress toward SMART and personal Core Component goals:   Patient just set goals at initial evaluation. Patient will be progressed towards goals as tolerated.     Plan for next 30 days:   group class: Understanding Heart Disease, group class: Common Heart  Medications, medication compliance, avoid places with second hand smoke, and monitor home BP    Group and Individual Education:  understanding high blood pressure and it's relationship to CAD, components of blood pressure management, proper use of sublingual NTG, group class: Understanding Heart Disease, and group class:  Common Heart Medications    Readiness to change: Preparation:  (Getting ready to change)

## 2024-05-31 ENCOUNTER — CLINICAL SUPPORT (OUTPATIENT)
Dept: CARDIAC REHAB | Facility: HOSPITAL | Age: 69
End: 2024-05-31
Payer: COMMERCIAL

## 2024-05-31 DIAGNOSIS — I25.10 CORONARY ARTERY DISEASE INVOLVING NATIVE CORONARY ARTERY OF NATIVE HEART WITHOUT ANGINA PECTORIS: ICD-10-CM

## 2024-05-31 DIAGNOSIS — Z98.890 S/P CARDIAC CATH: Primary | ICD-10-CM

## 2024-05-31 PROCEDURE — 93797 PHYS/QHP OP CAR RHAB WO ECG: CPT

## 2024-06-03 ENCOUNTER — CLINICAL SUPPORT (OUTPATIENT)
Dept: CARDIAC REHAB | Facility: HOSPITAL | Age: 69
End: 2024-06-03
Payer: COMMERCIAL

## 2024-06-03 DIAGNOSIS — Z98.890 S/P CARDIAC CATH: ICD-10-CM

## 2024-06-03 PROCEDURE — 93798 PHYS/QHP OP CAR RHAB W/ECG: CPT

## 2024-06-05 ENCOUNTER — CLINICAL SUPPORT (OUTPATIENT)
Dept: CARDIAC REHAB | Facility: HOSPITAL | Age: 69
End: 2024-06-05
Payer: COMMERCIAL

## 2024-06-05 DIAGNOSIS — Z98.890 S/P CARDIAC CATH: ICD-10-CM

## 2024-06-05 PROCEDURE — 93798 PHYS/QHP OP CAR RHAB W/ECG: CPT

## 2024-06-10 ENCOUNTER — CLINICAL SUPPORT (OUTPATIENT)
Dept: CARDIAC REHAB | Facility: HOSPITAL | Age: 69
End: 2024-06-10
Payer: COMMERCIAL

## 2024-06-10 DIAGNOSIS — Z98.890 S/P CARDIAC CATH: ICD-10-CM

## 2024-06-10 PROCEDURE — 93798 PHYS/QHP OP CAR RHAB W/ECG: CPT

## 2024-06-12 ENCOUNTER — CLINICAL SUPPORT (OUTPATIENT)
Dept: CARDIAC REHAB | Facility: HOSPITAL | Age: 69
End: 2024-06-12
Payer: COMMERCIAL

## 2024-06-12 DIAGNOSIS — Z98.890 S/P CARDIAC CATH: ICD-10-CM

## 2024-06-12 PROCEDURE — 93798 PHYS/QHP OP CAR RHAB W/ECG: CPT

## 2024-06-17 ENCOUNTER — CLINICAL SUPPORT (OUTPATIENT)
Dept: CARDIAC REHAB | Facility: HOSPITAL | Age: 69
End: 2024-06-17
Payer: COMMERCIAL

## 2024-06-17 DIAGNOSIS — Z98.890 S/P CARDIAC CATH: ICD-10-CM

## 2024-06-17 PROCEDURE — 93798 PHYS/QHP OP CAR RHAB W/ECG: CPT

## 2024-06-19 ENCOUNTER — APPOINTMENT (OUTPATIENT)
Dept: CARDIAC REHAB | Facility: HOSPITAL | Age: 69
End: 2024-06-19
Payer: COMMERCIAL

## 2024-06-24 ENCOUNTER — CLINICAL SUPPORT (OUTPATIENT)
Dept: CARDIAC REHAB | Facility: HOSPITAL | Age: 69
End: 2024-06-24
Payer: COMMERCIAL

## 2024-06-24 DIAGNOSIS — Z98.890 S/P CARDIAC CATH: ICD-10-CM

## 2024-06-24 PROCEDURE — 93798 PHYS/QHP OP CAR RHAB W/ECG: CPT

## 2024-06-26 ENCOUNTER — CLINICAL SUPPORT (OUTPATIENT)
Dept: CARDIAC REHAB | Facility: HOSPITAL | Age: 69
End: 2024-06-26
Payer: COMMERCIAL

## 2024-06-26 DIAGNOSIS — Z98.890 S/P CARDIAC CATH: Primary | ICD-10-CM

## 2024-06-26 PROCEDURE — 93798 PHYS/QHP OP CAR RHAB W/ECG: CPT

## 2024-06-28 NOTE — PROGRESS NOTES
CARDIAC REHABILITATION ASSESSMENT AND INDIVIDUALIZED TREATMENT PLAN  30 DAY REASSESSMENT          Today's date: 2024   # of Exercise Sessions Completed: 8  Patient name: Carlo Samuel      : 1955  Age: 69 y.o.       MRN: [id number]  Referring Physician: Corrie Sherman CRNP  Cardiologist: Dr. Chuy Rajput MD  Provider: [facility]  Clinician: Mahogany Wallace, MPT, CCRP        Dr. Rajput,   Please review the following patient assessment for Carlo to continue cardiac rehabilitation post cardiac cath.  Please verify you agree with the outlined plan of care and exercise prescription by signing with attached order.    Thank You.      Comments: Patient requires skilled CR to achieve goals and maximum functional capacity. CR is medically necessary for secondary prevention.         Dx: S/P Cardiac Cath    Description of Diagnosis: Cardiac catheterization w/ no intervention. Mid LAD 65% stenosed, Left Cx 40% stenosed, and Prox RCA 10% stenoses    Date of onset: 2024    Other Cardiac History: HTN, HLD, SURI to RCA in           ASSESSMENT    Medical History:   Past Medical History:   Diagnosis Date    Coronary artery disease     s/p stenting    History of epidural anesthesia     back L3/L4    Hyperlipidemia     Hypertension        Family History:  Family History   Problem Relation Age of Onset    Rheumatic fever Father     Heart disease Father     Cancer Father     Lung cancer Father        Allergies:   Patient has no known allergies.    Current Medications:   Current Outpatient Medications   Medication Sig Dispense Refill    amLODIPine (NORVASC) 5 mg tablet Take 1 tablet (5 mg total) by mouth daily 90 tablet 3    aspirin 81 MG tablet Take 81 mg by mouth daily       Calcium-Magnesium-Vitamin D (CALCIUM MAGNESIUM PO) Take by mouth      DULoxetine (CYMBALTA) 20 mg capsule Take 20 mg by mouth every evening      DULoxetine (CYMBALTA) 60 mg delayed release capsule Take 60 mg by mouth daily       gabapentin (NEURONTIN) 300 mg capsule Take 300 mg by mouth 3 (three) times a day      metoprolol succinate (TOPROL-XL) 25 mg 24 hr tablet Take 1 tablet (25 mg total) by mouth daily 90 tablet 3    Multiple Vitamin (MULTIVITAMIN) tablet Take 1 tablet by mouth daily      nitroglycerin (NITROSTAT) 0.4 mg SL tablet Place 1 tablet (0.4 mg total) under the tongue every 5 (five) minutes as needed for chest pain 25 tablet 3    rosuvastatin (CRESTOR) 40 MG tablet Take 1 tablet (40 mg total) by mouth daily 90 tablet 3    tamsulosin (FLOMAX) 0.4 mg Take 0.8 mg by mouth daily with dinner      tiZANidine (ZANAFLEX) 4 mg tablet Take 4 mg by mouth daily at bedtime       No current facility-administered medications for this visit.       Medication compliance: Yes   Comments: Pt reports to be compliant with medications    Physical Limitations: BLE neuropathy, R knee replacement, SOB    Fall Risk: Low   Comments: Ambulates with a steady gait with no assist device    Cultural needs: None      CAD Risk Factors:  Cholesterol: Yes (Chart states HLD but last lipid panel WNL)  HTN: Yes  DM: No  Obesity: Yes (BMI 34.85 kg/m2)  Inactivity: Yes      EXERCISE ASSESSMENT:    Initial Fitness Assessment:    Rest: HR 57, /66, SpO2 94%, Asx  Exercise: , /68, SpO2 97%, Asx, 1.84 METs   Reason for Discontinuation: Heart rate response and RPE 6  Recovery: HR 66, /60, SpO2 98%, Asx  ECG Interpretation: NSR      Functional Status Prior to Diagnosis for Treatment:   Occupation: full time job,  at [employer] [place]  Recreation/Physical Activity: Spend time outdoors  ADL’s: No limitations  [place]: No limitations  Home exercise equipment: Stationary bike  Home exercise: Ebike  Other: None    Current Functional Status:  Occupation: full time job,  at [employer]  Recreation/Physical Activity: Spend time outdoors  ADL’s:limited by Chest Pain  [place]: limited by Chest Pain  Home exercise: Has  "not resumed at this time due to CR and work schedule  Other: Patient has returned to work full time/duty    Functional Capacity Screening Tool:  [facility] Activity Status Index:  4.64 METs      PSYCHOSOCIAL ASSESSMENT:    Depression screening:  PHQ-9 = 7    Interpretation:  5-9 = Mild Depression  Anxiety screening:  MARINO-7 = 10    Interpretation: 10-14 = Moderate anxiety    Pt self-report of depression and anxiety   Patient reports they are coping well with good social support and denies depression or anxiety      Self-reported stress level:  8-10/10   Stressors:  Ongoing health issues, return to work  Stress Management Tools: practice Relaxation Techniques and keep a positive mindset    Quality of Life Screen:  (Higher score indicates disease impact on QOL)  [facility] COOP score: 31/40        Social Support:   spouse and friends  Community/Social Activities: Spend time with family and friends     Psychosocial Assessment as it relates to rehabilitation:   Patient denies issues with his family or home life that may affect their rehabilitation efforts.       NUTRITION ASSESSMENT:    Weight:    Wt Readings from Last 1 Encounters:   05/23/24 107 kg (236 lb)        Height:   Ht Readings from Last 1 Encounters:   05/23/24 5' 9\" (1.753 m)       Rate Your Plate Score: 42/81    Diabetes: N/A  A1c: No A1c in EPIC    Lipid management: Discussed diet and lipid management and Last lipid profile 4/27/2024  Chol 177    HDL 78  LDL 78    Current Dietary Habits:  Patient states he has removed sugar and sweets from his diet. Patient states he switched from table salt to sea salt but still doesn't use it often.     Drug/Alcohol Use:   Occasionally drinks Bud Lite      OTHER CORE COMPONENT ASSESSMENT:    Tobacco Use:     Pt quit in 2003   and has abstained. He avoids second hand smoke.     Anginal Symptoms:  chest pressure and SOB   NTG use: Compliant with carrying NTG, Understands proper use, and Reviewed Proper " use        INDIVIDUALIZED TREATMENT PLAN    Patient will attend Cardiac Rehab until program completion.  Patient may need to limit future vists depending on his finances. Currently he has a $50.00 co-pay per visit.   See outlined plan of care below for specific patient goals in each component of care.        EXERCISE GOAL and PLAN      SMART Goals:   10% improvement in functional capacity based on max METs achieved in initial fitness assessment  improved DASI score by 10%  increased exercise capacity by 40% based on peak METs tolerated in cardiac rehab exercise session  maintain > 150 minutes per week of moderate intensity exercise  Ability to work at a 5.0 MET Level or greater    Patient Specific EXERCISE GOALS:       resume ADLs with increased strength, return to work unrestricted, attend rehab regularly, and return to regular exercise regimen    Progress toward SMART and personal Exercise goals:Patient will be progressed towards goals as tolerated. Goals met: compliant fattening CR and returned to work full time/duty and Goals not met: has not resumed his HEP    Plan for next 30 days:    Attend CR 2-3x/week, resume light exercise program at home, increase daily walking and decrease sitting time at home.     The patient was counseled on exercise guidelines to achieve a minimum of 150 mins/wk of moderate intensity (RPE 4-6)   exercise and encouraged to add 1-2 days of exercise on opposite days of cardiac rehab as tolerated.     Current Aerobic Exercise Prescription:      Frequency: 2-3 days/week   Supplement with home exercise 2+ days/wk as tolerated       Minutes: 35-40         METS: 2.93           HR:    RPE: 4-5/10         Modalities: Treadmill, UBE, NuStep, and Recumbent bike     Exercise workloads will be progressed gradually as tolerated, within limits of patient's ability, and according to the patient's response to the exercise program.      Aerobic Exercise Prescription - 30 day goal:   Frequency: 2-3  days/week of cardiac rehab       Supplement with home exercise 2+ days/wk as tolerated    Minutes: 40-45       >150 mins/wk of moderate intensity exercise   METS: 2.93-4.0   HR:      RPE: 4-5/10   Modalities: Treadmill, UBE, NuStep, and Recumbent bike    Strength training:  CR department is currently w/o strength equipment. Patient will be introduced to strength training at a later date when equipment returns.     Home Exercise: Has not returned to his HEP at this time due to work schedule and attending CR.     Group and Individual Education: benefit of exercise for CAD risk factors, home exercise guidelines, AHA guidelines to achieve >150 mins/wk of moderate exercise, RPE scale, and Group class: Risk Factors for Heart Disease     Readiness to change: Action:  (Changing behavior)      NUTRITION GOALS AND PLAN    Weight control:    Starting weight: 236   Current weight:  236    No weight loss during this reporting period.     Nutritional   Reviewed patient's Rate your Plate. Discussed key elements of heart healthy eating. Reviewed patient goals for dietary modifications and their clinical implications.  Reviewed most recent lipid profile.     SMART Goals:   reduced BMI to < 25, fasting BG , Improved Rate Your Plate score  >58, and 2.5-5%  wt loss    Patient Specific NUTRITION GOALS:     Weight loss, improve diet, reduce daily sodium intake and increase daily fiber. Be more aware to drink water throughout he day.     Patient's progress toward SMART and personal Nutrition goals:    Patient will be progressed towards goals as tolerated. Patient is agreeable to making dietary modifications. There has been no weight loss. He is in the process of reducing his daily sodium intake.     Plan for next 30 days:   Patient will receive educational handouts on healthy eating and food label reading. Drink more water, reduce daily sodium to 2300 mg or less and increase daily fiber to 25-35 grams.     Measurable goals  "were based Rate Your Plate Dietary Self-Assessment. These are the areas in which the patient could score higher on the assessment.  Goals include recommendations for a heart healthy diet based on American Heart Association.    Group and Individual Education:   heart healthy eating principles  low sodium diet  maintaining hydration  group class: Heart Healthy Eating  group class:  Label Reading  \"Focus on Fiber: instruction    Readiness to change: Preparation:  (Getting ready to change)       PSYCHOSOCIAL ASSESSMENT AND PLAN    Psychosocial Assessment as it relates to rehabilitation:   Patient denies issues with his/her family or home life that may affect their rehabilitation efforts.     SMART Goals:     Reduce perceived stress to 1-3/10, improved [facility] QOL < 27, feel less tired with more energy, Improved appetite control, and take time to relax    Patient Specific PSYCHOCOSOCIAL GOALS:     spend time with family, reduce stress, relax more and initiate a hobby to avoid triggers.     Patient's progress toward SMART and personal Psychosocial goals:    Patient will be progressed towards goals as tolerated. Stress level remains high at 8-10/10.     Plan for next 30 days:   Class: Stress and Your Health, Spend time outdoors, Keep a positive mindset, and Enjoy family. Reduce stress level to 5/10.    Group and Individual Education: stress management techniques, benefits of enrolling in [employer], depression and CAD, and class:  Stress and Your Health     Information to utilize [employer] was provided as well as contact information for counseling through  Behavioral [facility] and group psychotherapy groups available.    Readiness to change: Preparation:  (Getting ready to change)       OTHER CORE COMPONENTS GOALS and PLAN      Blood Pressure will be monitored throughout the program and cardiologist will be notified of elevated trends.    Pt will be encouraged to monitor home BP if advised by cardiologist.    Tobacco " Intervention:   Pt quit in 2003 and has abstained since quitting.  He avoids second hand smoke.     SMART Goals:   consistent, controlled resting BP < 130/80, reduced angina, medication compliance, and abstain from smoking    Patient Specific CORE COMPONENT GOALS:    reduced dietary sodium <2000mg, medication compliance, reduce number of medications  needed for BP control, and Abstain from smoking    Progress toward SMART and personal Core Component goals:    Patient will be progressed towards goals as tolerated. He remains smoke free. He is working towards a consistent resting BP < 130/80.     Plan for next 30 days:   group class: Understanding Heart Disease, group class: Common Heart Medications, medication compliance, avoid places with second hand smoke, and monitor home BP    Group and Individual Education:  understanding high blood pressure and it's relationship to CAD, components of blood pressure management, proper use of sublingual NTG, group class: Understanding Heart Disease, and group class:  Common Heart Medications. Patient has been educated on the AHA BP categories.     Readiness to change: Preparation:  (Getting ready to change)

## 2024-07-01 ENCOUNTER — CLINICAL SUPPORT (OUTPATIENT)
Dept: CARDIAC REHAB | Facility: HOSPITAL | Age: 69
End: 2024-07-01
Payer: COMMERCIAL

## 2024-07-01 DIAGNOSIS — Z98.890 S/P CARDIAC CATH: ICD-10-CM

## 2024-07-01 PROCEDURE — 93798 PHYS/QHP OP CAR RHAB W/ECG: CPT

## 2024-07-03 ENCOUNTER — TELEPHONE (OUTPATIENT)
Dept: CARDIOLOGY CLINIC | Facility: CLINIC | Age: 69
End: 2024-07-03

## 2024-07-03 ENCOUNTER — CLINICAL SUPPORT (OUTPATIENT)
Dept: CARDIAC REHAB | Facility: HOSPITAL | Age: 69
End: 2024-07-03
Payer: COMMERCIAL

## 2024-07-03 DIAGNOSIS — Z98.890 S/P CARDIAC CATH: ICD-10-CM

## 2024-07-03 PROCEDURE — 93798 PHYS/QHP OP CAR RHAB W/ECG: CPT

## 2024-07-03 NOTE — TELEPHONE ENCOUNTER
Patient called back to confirm that he got voice message left by Leanne.  He is okay with the 7/19 appt.  Also understands instruction regarding transferring meds to [facility]

## 2024-07-03 NOTE — TELEPHONE ENCOUNTER
Called and lmom for pt regarding appt on 7/19 and also for him to comtact Noemi's to have meds transferred to [facility]

## 2024-07-05 ENCOUNTER — TELEPHONE (OUTPATIENT)
Dept: CARDIOLOGY CLINIC | Facility: CLINIC | Age: 69
End: 2024-07-05

## 2024-07-08 ENCOUNTER — CLINICAL SUPPORT (OUTPATIENT)
Dept: CARDIAC REHAB | Facility: HOSPITAL | Age: 69
End: 2024-07-08
Payer: COMMERCIAL

## 2024-07-08 DIAGNOSIS — Z98.890 S/P CARDIAC CATH: ICD-10-CM

## 2024-07-08 PROCEDURE — 93798 PHYS/QHP OP CAR RHAB W/ECG: CPT

## 2024-07-10 ENCOUNTER — CLINICAL SUPPORT (OUTPATIENT)
Dept: CARDIAC REHAB | Facility: HOSPITAL | Age: 69
End: 2024-07-10
Payer: COMMERCIAL

## 2024-07-10 DIAGNOSIS — Z98.890 S/P CARDIAC CATH: ICD-10-CM

## 2024-07-10 PROCEDURE — 93798 PHYS/QHP OP CAR RHAB W/ECG: CPT

## 2024-07-12 DIAGNOSIS — I25.10 CORONARY ARTERY DISEASE INVOLVING NATIVE CORONARY ARTERY OF NATIVE HEART WITHOUT ANGINA PECTORIS: ICD-10-CM

## 2024-07-12 RX ORDER — AMLODIPINE BESYLATE 5 MG/1
5 TABLET ORAL DAILY
Qty: 90 TABLET | Refills: 3 | Status: SHIPPED | OUTPATIENT
Start: 2024-07-12

## 2024-07-12 RX ORDER — METOPROLOL SUCCINATE 25 MG/1
25 TABLET, EXTENDED RELEASE ORAL DAILY
Qty: 90 TABLET | Refills: 3 | Status: SHIPPED | OUTPATIENT
Start: 2024-07-12

## 2024-07-12 RX ORDER — ROSUVASTATIN CALCIUM 40 MG/1
40 TABLET, COATED ORAL DAILY
Qty: 90 TABLET | Refills: 3 | Status: SHIPPED | OUTPATIENT
Start: 2024-07-12

## 2024-07-15 ENCOUNTER — CLINICAL SUPPORT (OUTPATIENT)
Dept: CARDIAC REHAB | Facility: HOSPITAL | Age: 69
End: 2024-07-15
Payer: COMMERCIAL

## 2024-07-15 DIAGNOSIS — Z98.890 S/P CARDIAC CATH: ICD-10-CM

## 2024-07-15 PROCEDURE — 93798 PHYS/QHP OP CAR RHAB W/ECG: CPT

## 2024-07-17 ENCOUNTER — CLINICAL SUPPORT (OUTPATIENT)
Dept: CARDIAC REHAB | Facility: HOSPITAL | Age: 69
End: 2024-07-17
Payer: COMMERCIAL

## 2024-07-17 DIAGNOSIS — Z98.890 S/P CARDIAC CATH: ICD-10-CM

## 2024-07-17 PROCEDURE — 93798 PHYS/QHP OP CAR RHAB W/ECG: CPT

## 2024-07-19 ENCOUNTER — OFFICE VISIT (OUTPATIENT)
Dept: CARDIOLOGY CLINIC | Facility: CLINIC | Age: 69
End: 2024-07-19
Payer: COMMERCIAL

## 2024-07-19 VITALS
WEIGHT: 233 LBS | SYSTOLIC BLOOD PRESSURE: 122 MMHG | DIASTOLIC BLOOD PRESSURE: 68 MMHG | RESPIRATION RATE: 16 BRPM | HEIGHT: 67 IN | HEART RATE: 52 BPM | OXYGEN SATURATION: 95 % | BODY MASS INDEX: 36.57 KG/M2

## 2024-07-19 DIAGNOSIS — E78.2 MIXED HYPERLIPIDEMIA: Primary | ICD-10-CM

## 2024-07-19 DIAGNOSIS — I20.89 STABLE ANGINA: ICD-10-CM

## 2024-07-19 DIAGNOSIS — I25.10 CORONARY ARTERY DISEASE INVOLVING NATIVE CORONARY ARTERY OF NATIVE HEART WITHOUT ANGINA PECTORIS: ICD-10-CM

## 2024-07-19 PROCEDURE — 99214 OFFICE O/P EST MOD 30 MIN: CPT | Performed by: NURSE PRACTITIONER

## 2024-07-19 RX ORDER — ISOSORBIDE MONONITRATE 30 MG/1
30 TABLET, EXTENDED RELEASE ORAL DAILY
COMMUNITY
Start: 2024-05-23

## 2024-07-19 RX ORDER — VITAMIN B COMPLEX
1 CAPSULE ORAL DAILY
COMMUNITY

## 2024-07-19 NOTE — ASSESSMENT & PLAN NOTE
S/p SURI to pRCA and D2 6/2009 patent on recent cath  Mid-LAD lesion noted-GDMT and future PCI if refractory CP  Continue asa, statin, nitrates, BB, CCB

## 2024-07-19 NOTE — PROGRESS NOTES
Patient ID: Carlo Samuel is a 69 y.o. male.        Plan:      Coronary artery disease involving native coronary artery of native heart without angina pectoris  S/p SURI to pRCA and D2 6/2009 patent on recent cath  Mid-LAD lesion noted-GDMT and future PCI if refractory CP  Continue asa, statin, nitrates, BB, CCB    Stable angina  Stable with nitrate/BB/CCB      Mixed hyperlipidemia  Continue Crestor 40 mg daily        Follow up Plan/Summary Comments:  Overall, Carlo is doing well.  His symptoms are stable at this time.  He is partcipiating in cardiac rehab.    I note that he is bradycardic and reports fatigue.  I recommended reducing his metoprolol to 12.5 mg daily and encouraged him to take it before bed.      Continue current meds and cardiac rehab.  Follow up in 3 months.  He is to report new or worsening symptoms.      HPI: Carlo returns for a follow up visit. Since his last visit, her underwent a cardiac cath for evaluation of stable angina.      Medical management and risk factor modification was recommended.      Carlo continues to tire easily and gets short of breath with activity, but reports his symptoms are stable.  He denies any chest pain or new symptoms.      He has some reported side effects from his new medications-constipation, diarrhea, fatigue.  He says they are tolerable and not every day.      Review of Systems   10  point ROS  was otherwise non pertinent or negative except as per HPI or as below.   Gait: Normal      Most recent or relevant cardiac/vascular testing:    Cardiac cath 5/23/2024  Mid LAD with a hemodynamically significant, calcified lesion warranting GDMT initiation and PCI with likely IVL if refractory CP    Prox RCA with patent prior 2009 Stent and only mild ISR    LVEDP is normal without gradient with minimal gradient on LV-AO pullback    Echo 5/23/2024   EF 49%, grade II diastolic dysfunction  Mild LAE  Mildly dilated aortic root, 4.1 cm    Objective:     /68 (BP  "Location: Left arm, Patient Position: Standing, Cuff Size: Large)   Pulse (!) 52   Resp 16   Ht 5' 7.13\" (1.705 m)   Wt 106 kg (233 lb)   SpO2 95%   BMI 36.36 kg/m²     PHYSICAL EXAM:    General:  Normal appearance, no acute distress  Eyes:  Anicteric.  Oral mucosa:  Moist.  Neck:  No JVD. Carotid upstrokes are brisk without bruits.  No masses.  Chest:  Clear to auscultation   Cardiac:  No palpable PMI.  Normal S1 and S2.  No murmur gallop or rub.  Abdomen:  Soft and nontender. No palpable organomegaly or aortic enlargement.  Extremities:  No peripheral edema.  Musculoskeletal:  Symmetric.   Vascular:  Pedal pulses are intact.  Neuro:  Grossly symmetric.  Psych:  Alert and oriented x3.    No Known Allergies    Current Outpatient Medications:     amLODIPine (NORVASC) 5 mg tablet, Take 1 tablet (5 mg total) by mouth daily, Disp: 90 tablet, Rfl: 3    aspirin 81 MG tablet, Take 81 mg by mouth daily , Disp: , Rfl:     b complex vitamins capsule, Take 1 capsule by mouth daily, Disp: , Rfl:     Calcium-Magnesium-Vitamin D (CALCIUM MAGNESIUM PO), Take by mouth, Disp: , Rfl:     DULoxetine (CYMBALTA) 20 mg capsule, Take 20 mg by mouth every evening, Disp: , Rfl:     DULoxetine (CYMBALTA) 60 mg delayed release capsule, Take 60 mg by mouth daily, Disp: , Rfl:     gabapentin (NEURONTIN) 300 mg capsule, Take 300 mg by mouth 3 (three) times a day, Disp: , Rfl:     isosorbide mononitrate (IMDUR) 30 mg 24 hr tablet, Take 30 mg by mouth daily, Disp: , Rfl:     metoprolol succinate (TOPROL-XL) 25 mg 24 hr tablet, Take 1 tablet (25 mg total) by mouth daily, Disp: 90 tablet, Rfl: 3    Multiple Vitamin (MULTIVITAMIN) tablet, Take 1 tablet by mouth daily, Disp: , Rfl:     nitroglycerin (NITROSTAT) 0.4 mg SL tablet, Place 1 tablet (0.4 mg total) under the tongue every 5 (five) minutes as needed for chest pain, Disp: 25 tablet, Rfl: 3    rosuvastatin (CRESTOR) 40 MG tablet, Take 1 tablet (40 mg total) by mouth daily, Disp: 90 tablet, " Rfl: 3    tamsulosin (FLOMAX) 0.4 mg, Take 0.8 mg by mouth daily with dinner, Disp: , Rfl:     tiZANidine (ZANAFLEX) 4 mg tablet, Take 4 mg by mouth daily at bedtime, Disp: , Rfl:   Past Medical History:   Diagnosis Date    Coronary artery disease     s/p stenting    History of epidural anesthesia     back L3/L4    Hyperlipidemia     Hypertension      Past Surgical History:   Procedure Laterality Date    ANTERIOR FUSION LUMBAR SPINE      BACK SURGERY      CARDIAC CATHETERIZATION Left 2024    Procedure: Cardiac catheterization;  Surgeon: Jenifer Avendano DO;  Location: [facility];  Service: Cardiology    CARDIAC CATHETERIZATION N/A 2024    Procedure: Cardiac Coronary Angiogram;  Surgeon: Jenifer Avendano DO;  Location: [facility];  Service: Cardiology    CARDIAC CATHETERIZATION N/A 2024    Procedure: Cardiac FFR/IFR;  Surgeon: Jenifer Avendano DO;  Location: [facility];  Service: Cardiology    CARPAL TUNNEL RELEASE      CORONARY ANGIOPLASTY WITH STENT PLACEMENT  2009    SURI to 95% prox RCA and 99% D2.    INGUINAL HERNIA REPAIR      NASAL TURBINATE REDUCTION Bilateral 10/14/2019    with outfracture - office procedure - Dr. Chapman    REPLACEMENT TOTAL KNEE      SHOULDER ARTHROPLASTY         CMP:   Lab Results   Component Value Date    K 4.5 2024     2024    CO2 27 2024    BUN 14 2024    CREATININE 0.81 2024    EGFR 89 2024     Lipid Profile:    Lab Results   Component Value Date    TRIG 104 2024    HDL 78 2024         Social History     Tobacco Use   Smoking Status Former    Current packs/day: 0.00    Types: Cigarettes    Quit date:     Years since quittin.5   Smokeless Tobacco Never

## 2024-07-22 ENCOUNTER — APPOINTMENT (OUTPATIENT)
Dept: CARDIAC REHAB | Facility: HOSPITAL | Age: 69
End: 2024-07-22
Payer: COMMERCIAL

## 2024-07-24 ENCOUNTER — CLINICAL SUPPORT (OUTPATIENT)
Dept: CARDIAC REHAB | Facility: HOSPITAL | Age: 69
End: 2024-07-24
Payer: COMMERCIAL

## 2024-07-24 DIAGNOSIS — Z98.890 S/P CARDIAC CATH: ICD-10-CM

## 2024-07-24 PROCEDURE — 93798 PHYS/QHP OP CAR RHAB W/ECG: CPT

## 2024-07-26 ENCOUNTER — CLINICAL SUPPORT (OUTPATIENT)
Dept: CARDIAC REHAB | Facility: HOSPITAL | Age: 69
End: 2024-07-26
Payer: COMMERCIAL

## 2024-07-26 DIAGNOSIS — I25.10 CORONARY ARTERY DISEASE INVOLVING NATIVE CORONARY ARTERY OF NATIVE HEART WITHOUT ANGINA PECTORIS: Primary | ICD-10-CM

## 2024-07-26 DIAGNOSIS — Z98.890 S/P CARDIAC CATH: Primary | ICD-10-CM

## 2024-07-26 PROCEDURE — 93798 PHYS/QHP OP CAR RHAB W/ECG: CPT

## 2024-07-26 NOTE — PROGRESS NOTES
CARDIAC REHABILITATION ASSESSMENT AND INDIVIDUALIZED TREATMENT PLAN  60 DAY REASSESSMENT          Today's date: 2024   # of Exercise Sessions Completed: 16  Patient name: Carlo Samuel      : 1955  Age: 69 y.o.       MRN: [id number]  Referring Physician: Corrie Sherman CRNP  Cardiologist: Dr. Chuy Rajput MD  Provider: [place]  Clinician: Mahogany Wallace, MPT, CCRP        Dr. Rajput,   Please review the following patient reassessment for Carlo to continue cardiac rehabilitation post cardiac cath.  Please verify you agree with the outlined plan of care and exercise prescription by signing with attached order.    Thank You.      Comments: Patient requires skilled CR to achieve goals and maximum functional capacity. CR is medically necessary for secondary prevention.         Dx: S/P Cardiac Cath    Description of Diagnosis: Cardiac catheterization w/ no intervention. Mid LAD 65% stenosed, Left Cx 40% stenosed, and Prox RCA 10% stenoses    Date of onset: 2024    Other Cardiac History: HTN, HLD, SURI to RCA in           ASSESSMENT    Medical History:   Past Medical History:   Diagnosis Date    Coronary artery disease     s/p stenting    History of epidural anesthesia     back L3/L4    Hyperlipidemia     Hypertension        Family History:  Family History   Problem Relation Age of Onset    Rheumatic fever Father     Heart disease Father     Cancer Father     Lung cancer Father        Allergies:   Patient has no known allergies.    Current Medications:   Current Outpatient Medications   Medication Sig Dispense Refill    amLODIPine (NORVASC) 5 mg tablet Take 1 tablet (5 mg total) by mouth daily 90 tablet 3    aspirin 81 MG tablet Take 81 mg by mouth daily       b complex vitamins capsule Take 1 capsule by mouth daily      Calcium-Magnesium-Vitamin D (CALCIUM MAGNESIUM PO) Take by mouth      DULoxetine (CYMBALTA) 20 mg capsule Take 20 mg by mouth every evening      DULoxetine (CYMBALTA) 60  mg delayed release capsule Take 60 mg by mouth daily      gabapentin (NEURONTIN) 300 mg capsule Take 300 mg by mouth 3 (three) times a day      isosorbide mononitrate (IMDUR) 30 mg 24 hr tablet Take 30 mg by mouth daily      metoprolol succinate (TOPROL-XL) 25 mg 24 hr tablet Take 1 tablet (25 mg total) by mouth daily 90 tablet 3    Multiple Vitamin (MULTIVITAMIN) tablet Take 1 tablet by mouth daily      nitroglycerin (NITROSTAT) 0.4 mg SL tablet Place 1 tablet (0.4 mg total) under the tongue every 5 (five) minutes as needed for chest pain 25 tablet 3    rosuvastatin (CRESTOR) 40 MG tablet Take 1 tablet (40 mg total) by mouth daily 90 tablet 3    tamsulosin (FLOMAX) 0.4 mg Take 0.8 mg by mouth daily with dinner      tiZANidine (ZANAFLEX) 4 mg tablet Take 4 mg by mouth daily at bedtime       No current facility-administered medications for this visit.       Medication compliance: Yes   Comments: Pt reports to be compliant with medications    Physical Limitations: BLE neuropathy, R knee replacement, SOB    Fall Risk: Low   Comments: Ambulates with a steady gait with no assist device    Cultural needs: None      CAD Risk Factors:  Cholesterol: Yes (Chart states HLD but last lipid panel WNL)  HTN: Yes  DM: No  Obesity: Yes (BMI 34.85 kg/m2)  Inactivity: Yes      EXERCISE ASSESSMENT:    Initial Fitness Assessment:    Rest: HR 57, /66, SpO2 94%, Asx  Exercise: , /68, SpO2 97%, Asx, 1.84 METs   Reason for Discontinuation: Heart rate response and RPE 6  Recovery: HR 66, /60, SpO2 98%, Asx  ECG Interpretation: NSR      Functional Status Prior to Diagnosis for Treatment:   Occupation: full time job,  at [employer] and [employer]  Recreation/Physical Activity: Spend time outdoors  ADL’s: No limitations  [place]: No limitations  Home exercise equipment: Stationary bike  Home exercise: Ebike  Other: None    Current Functional Status:  Occupation: full time job,  at [employer] and  "[employer]  Recreation/Physical Activity: Spend time outdoors  ADL’s:limited by Chest Pain  [place]: limited by Chest Pain  Home exercise: Has not resumed at this time due to CR and work schedule  Other: Patient has returned to work full time/duty    Functional Capacity Screening Tool:  [facility] Activity Status Index:  4.64 METs      PSYCHOSOCIAL ASSESSMENT:    Depression screening:  PHQ-9 = 7    Interpretation:  5-9 = Mild Depression  Anxiety screening:  MARINO-7 = 10    Interpretation: 10-14 = Moderate anxiety    Pt self-report of depression and anxiety   Patient reports he is coping well with good social support and denies depression or anxiety      Self-reported stress level:  7-8/10   Stressors:  Ongoing health issues, return to work, finances  Stress Management Tools: practice Relaxation Techniques and keep a positive mindset    Quality of Life Screen:  (Higher score indicates disease impact on QOL)  [facility] COOP score: 31/40        Social Support:   spouse and friends  Community/Social Activities: Spend time with family and friends     Psychosocial Assessment as it relates to rehabilitation:   Patient denies issues with his family or home life that may affect their rehabilitation efforts.       NUTRITION ASSESSMENT:    Weight:    Wt Readings from Last 1 Encounters:   07/19/24 106 kg (233 lb)        Height:   Ht Readings from Last 1 Encounters:   07/19/24 5' 7.13\" (1.705 m)       Rate Your Plate Score: 42/81    Diabetes: N/A  A1c: No A1c in EPIC    Lipid management: Discussed diet and lipid management and Last lipid profile 4/27/2024  Chol 177    HDL 78  LDL 78      Current Dietary Habits:  Patient states he has removed sugar and sweets from his diet. Patient states he switched from table salt to sea salt but still doesn't use it often. He is trying to incorporate more whole grains into his diet.     Drug/Alcohol Use:   Occasionally drinks Bud Lite      OTHER CORE COMPONENT ASSESSMENT:    Tobacco Use:   "   Pt quit in 2003   and has abstained. He avoids second hand smoke and other respiratory irritants..     Anginal Symptoms:  chest pressure and SOB   NTG use: Compliant with carrying NTG, Understands proper use, and Reviewed Proper use        INDIVIDUALIZED TREATMENT PLAN    Patient will attend Cardiac Rehab until program completion.  Patient may need to limit future vists depending on his finances. Currently he has a $50.00 co-pay per visit.   See outlined plan of care below for specific patient goals in each component of care.        EXERCISE GOAL and PLAN      SMART Goals:   10% improvement in functional capacity based on max METs achieved in initial fitness assessment  improved DASI score by 10%  increased exercise capacity by 40% based on peak METs tolerated in cardiac rehab exercise session  maintain > 150 minutes per week of moderate intensity exercise  Ability to work at a 5.0 MET Level or greater    Patient Specific EXERCISE GOALS:       resume ADLs with increased strength, return to work unrestricted, attend rehab regularly, and return to regular exercise regimen    Progress toward SMART and personal Exercise goals:Patient will be progressed towards goals as tolerated. Goals met: compliant fattening CR and returned to work full time/duty and Goals not met: has not resumed his HEP . He is still working at a 2.93 MET Level. He is now able to tolerate 40-45 minutes of aerobic exercise w/out intervals.     Plan for next 30 days:    Attend CR 2-3x/week, resume light exercise program at home, increase daily walking and decrease sitting time at home.     The patient was counseled on exercise guidelines to achieve a minimum of 150 mins/wk of moderate intensity (RPE 4-6)   exercise and encouraged to add 1-2 days of exercise on opposite days of cardiac rehab as tolerated.     Current Aerobic Exercise Prescription:      Frequency: 2-3 days/week   Supplement with home exercise 2+ days/wk as tolerated       Minutes:  40-45         METS: 2.93           HR:    RPE: 4-5/10         Modalities: Treadmill, UBE, NuStep, and Recumbent bike     Exercise workloads will be progressed gradually as tolerated, within limits of patient's ability, and according to the patient's response to the exercise program.      Aerobic Exercise Prescription - 30 day goal:   Frequency: 2-3 days/week of cardiac rehab       Supplement with home exercise 2+ days/wk as tolerated    Minutes: 40-45       >150 mins/wk of moderate intensity exercise   METS: 2.93-4.5   HR:      RPE: 4-5/10   Modalities: Treadmill, UBE, NuStep, and Recumbent bike    Strength training:  CR department is currently w/o strength equipment. Patient will be introduced to strength training at a later date when equipment returns.     Home Exercise: Has not returned to his HEP at this time due to work schedule and attending CR.     Group and Individual Education: benefit of exercise for CAD risk factors, home exercise guidelines, AHA guidelines to achieve >150 mins/wk of moderate exercise, RPE scale, and Group class: Risk Factors for Heart Disease     Readiness to change: Action:  (Changing behavior)      NUTRITION GOALS AND PLAN    Weight control:    Starting weight: 236   Current weight:  233    No significant  weight loss during this reporting period.     Nutritional   Reviewed patient's Rate your Plate. Discussed key elements of heart healthy eating. Reviewed patient goals for dietary modifications and their clinical implications.  Reviewed most recent lipid profile.     SMART Goals:   reduced BMI to < 25, fasting BG , Improved Rate Your Plate score  >58, and 2.5-5%  wt loss    Patient Specific NUTRITION GOALS:     Weight loss, improve diet, reduce daily sodium intake and increase daily fiber. Be more aware to drink water throughout he day.     Patient's progress toward SMART and personal Nutrition goals:    Patient will be progressed towards goals as tolerated. Patient  "is agreeable to making dietary modifications. There has been no significant  weight loss. He is in the process of reducing his daily sodium intake.     Plan for next 30 days:   Patient has received educational handouts on healthy eating and food label reading. Drink more water, reduce daily sodium to 2300 mg or less and increase daily fiber to 25-35 grams. He has been encouraged to reduce intake of saturated fats and increase intake of whole grains.     Measurable goals were based Rate Your Plate Dietary Self-Assessment. These are the areas in which the patient could score higher on the assessment.  Goals include recommendations for a heart healthy diet based on American Heart Association.    Group and Individual Education:   heart healthy eating principles  low sodium diet  maintaining hydration  group class: Heart Healthy Eating  group class:  Label Reading  \"Focus on Fiber: instruction    Readiness to change: Action:  (Changing behavior)      PSYCHOSOCIAL ASSESSMENT AND PLAN    Psychosocial Assessment as it relates to rehabilitation:   Patient denies issues with his/her family or home life that may affect their rehabilitation efforts.     SMART Goals:     Reduce perceived stress to 1-3/10, improved [facility] QOL < 27, feel less tired with more energy, Improved appetite control, and take time to relax    Patient Specific PSYCHOCOSOCIAL GOALS:     spend time with family, reduce stress, relax more and initiate a hobby to avoid triggers.     Patient's progress toward SMART and personal Psychosocial goals:    Patient will be progressed towards goals as tolerated. Stress level remains high at 7-8/10.     Plan for next 30 days:   Class: Stress and Your Health, Spend time outdoors, Keep a positive mindset, and Enjoy family. Reduce stress level to 5/10.CR staff to offer support as needed. Continue to take prescription of Cymbalta.     Group and Individual Education: stress management techniques, benefits of enrolling in " [facility], depression and CAD, and class:  Stress and Your Health     Information to utilize [facility] was provided as well as contact information for counseling through  [facility] and group psychotherapy groups available.    Readiness to change: Preparation:  (Getting ready to change)       OTHER CORE COMPONENTS GOALS and PLAN      Blood Pressure will be monitored throughout the program and cardiologist will be notified of elevated trends.    Pt will be encouraged to monitor home BP if advised by cardiologist.    Tobacco Intervention:   Pt quit in 2003 and has abstained since quitting.  He avoids second hand smoke.     SMART Goals:   consistent, controlled resting BP < 130/80, reduced angina, medication compliance, and abstain from smoking    Patient Specific CORE COMPONENT GOALS:    reduced dietary sodium <2000mg, medication compliance, reduce number of medications  needed for BP control, and Abstain from smoking    Progress toward SMART and personal Core Component goals:    Patient will be progressed towards goals as tolerated. He remains smoke free. He is working towards a consistent resting BP < 130/80.     Plan for next 30 days:   group class: Understanding Heart Disease, group class: Common Heart Medications, medication compliance, avoid places with second hand smoke, and monitor home BP    Group and Individual Education:  understanding high blood pressure and it's relationship to CAD, components of blood pressure management, proper use of sublingual NTG, group class: Understanding Heart Disease, and group class:  Common Heart Medications. Patient has been educated on the AHA BP categories.     Readiness to change: Action:  (Changing behavior)

## 2024-07-29 ENCOUNTER — TELEPHONE (OUTPATIENT)
Dept: CARDIOLOGY CLINIC | Facility: CLINIC | Age: 69
End: 2024-07-29

## 2024-07-29 ENCOUNTER — APPOINTMENT (OUTPATIENT)
Dept: CARDIAC REHAB | Facility: HOSPITAL | Age: 69
End: 2024-07-29
Payer: COMMERCIAL

## 2024-07-29 RX ORDER — ISOSORBIDE MONONITRATE 30 MG/1
30 TABLET, EXTENDED RELEASE ORAL DAILY
Qty: 90 TABLET | Refills: 3 | Status: SHIPPED | OUTPATIENT
Start: 2024-07-29

## 2024-07-31 ENCOUNTER — APPOINTMENT (OUTPATIENT)
Dept: CARDIAC REHAB | Facility: HOSPITAL | Age: 69
End: 2024-07-31
Payer: COMMERCIAL

## 2024-08-05 ENCOUNTER — CLINICAL SUPPORT (OUTPATIENT)
Dept: CARDIAC REHAB | Facility: HOSPITAL | Age: 69
End: 2024-08-05
Payer: COMMERCIAL

## 2024-08-05 DIAGNOSIS — Z98.890 S/P CARDIAC CATH: ICD-10-CM

## 2024-08-05 PROCEDURE — 93798 PHYS/QHP OP CAR RHAB W/ECG: CPT

## 2024-08-07 ENCOUNTER — CLINICAL SUPPORT (OUTPATIENT)
Dept: CARDIAC REHAB | Facility: HOSPITAL | Age: 69
End: 2024-08-07
Payer: COMMERCIAL

## 2024-08-07 DIAGNOSIS — Z98.890 S/P CARDIAC CATH: Primary | ICD-10-CM

## 2024-08-07 PROCEDURE — 93798 PHYS/QHP OP CAR RHAB W/ECG: CPT

## 2024-08-12 ENCOUNTER — CLINICAL SUPPORT (OUTPATIENT)
Dept: CARDIAC REHAB | Facility: HOSPITAL | Age: 69
End: 2024-08-12
Payer: COMMERCIAL

## 2024-08-12 DIAGNOSIS — Z98.890 S/P CARDIAC CATH: ICD-10-CM

## 2024-08-12 PROCEDURE — 93798 PHYS/QHP OP CAR RHAB W/ECG: CPT

## 2024-08-14 ENCOUNTER — CLINICAL SUPPORT (OUTPATIENT)
Dept: CARDIAC REHAB | Facility: HOSPITAL | Age: 69
End: 2024-08-14
Payer: COMMERCIAL

## 2024-08-14 DIAGNOSIS — Z98.890 S/P CARDIAC CATH: Primary | ICD-10-CM

## 2024-08-14 PROCEDURE — 93798 PHYS/QHP OP CAR RHAB W/ECG: CPT

## 2024-08-19 ENCOUNTER — APPOINTMENT (OUTPATIENT)
Dept: CARDIAC REHAB | Facility: HOSPITAL | Age: 69
End: 2024-08-19
Payer: COMMERCIAL

## 2024-08-21 ENCOUNTER — APPOINTMENT (OUTPATIENT)
Dept: CARDIAC REHAB | Facility: HOSPITAL | Age: 69
End: 2024-08-21
Payer: COMMERCIAL

## 2024-08-22 NOTE — PROGRESS NOTES
CARDIAC REHABILITATION ASSESSMENT AND INDIVIDUALIZED TREATMENT PLAN  90 DAY REASSESSMENT          Today's date: 2024   # of Exercise Sessions Completed: 20  Patient name: Carlo Samuel      : 1955  Age: 69 y.o.       MRN: [id number]  Referring Physician: Corrie Sherman CRNP  Cardiologist: Dr. Chuy Rajput MD  Provider: [place]  Clinician: Erika Sacks, MS Dr. Rajput,   Please review the following patient reassessment for Carlo to continue cardiac rehabilitation post cardiac cath.  Please verify you agree with the outlined plan of care and exercise prescription by signing with attached order.    Thank You.      Comments: Carlo has completed 20 sessions of cardiac rehab and has been working at a 2.93 MET level. He will continue to be progressed as tolerated.         Dx: S/P Cardiac Cath    Description of Diagnosis: Cardiac catheterization w/ no intervention. Mid LAD 65% stenosed, Left Cx 40% stenosed, and Prox RCA 10% stenoses    Date of onset: 2024    Other Cardiac History: HTN, HLD, SURI to RCA in           ASSESSMENT    Medical History:   Past Medical History:   Diagnosis Date    Coronary artery disease     s/p stenting    History of epidural anesthesia     back L3/L4    Hyperlipidemia     Hypertension        Family History:  Family History   Problem Relation Age of Onset    Rheumatic fever Father     Heart disease Father     Cancer Father     Lung cancer Father        Allergies:   Patient has no known allergies.    Current Medications:   Current Outpatient Medications   Medication Sig Dispense Refill    amLODIPine (NORVASC) 5 mg tablet Take 1 tablet (5 mg total) by mouth daily 90 tablet 3    aspirin 81 MG tablet Take 81 mg by mouth daily       b complex vitamins capsule Take 1 capsule by mouth daily      Calcium-Magnesium-Vitamin D (CALCIUM MAGNESIUM PO) Take by mouth      DULoxetine (CYMBALTA) 20 mg capsule Take 20 mg by mouth every evening      DULoxetine (CYMBALTA) 60  mg delayed release capsule Take 60 mg by mouth daily      gabapentin (NEURONTIN) 300 mg capsule Take 300 mg by mouth 3 (three) times a day      isosorbide mononitrate (IMDUR) 30 mg 24 hr tablet Take 1 tablet (30 mg total) by mouth daily 90 tablet 3    metoprolol succinate (TOPROL-XL) 25 mg 24 hr tablet Take 1 tablet (25 mg total) by mouth daily 90 tablet 3    Multiple Vitamin (MULTIVITAMIN) tablet Take 1 tablet by mouth daily      nitroglycerin (NITROSTAT) 0.4 mg SL tablet Place 1 tablet (0.4 mg total) under the tongue every 5 (five) minutes as needed for chest pain 25 tablet 3    rosuvastatin (CRESTOR) 40 MG tablet Take 1 tablet (40 mg total) by mouth daily 90 tablet 3    tamsulosin (FLOMAX) 0.4 mg Take 0.8 mg by mouth daily with dinner      tiZANidine (ZANAFLEX) 4 mg tablet Take 4 mg by mouth daily at bedtime       No current facility-administered medications for this visit.       Medication compliance: Yes   Comments: Pt reports to be compliant with medications    Physical Limitations: BLE neuropathy, R knee replacement, SOB    Fall Risk: Low   Comments: Ambulates with a steady gait with no assist device    Cultural needs: None      CAD Risk Factors:  Cholesterol: Yes (Chart states HLD but last lipid panel WNL)  HTN: Yes  DM: No  Obesity: Yes (BMI 34.85 kg/m2)   8/22/2024: BMI has gone up to 36.36 kg/m2)  Inactivity: Yes      EXERCISE ASSESSMENT:    Initial Fitness Assessment:    Rest: HR 57, /66, SpO2 94%, Asx  Exercise: , /68, SpO2 97%, Asx, 1.84 METs   Reason for Discontinuation: Heart rate response and RPE 6  Recovery: HR 66, /60, SpO2 98%, Asx  ECG Interpretation: NSR      Functional Status Prior to Diagnosis for Treatment:   Occupation: full time job,  at [employer]  Recreation/Physical Activity: Spend time outdoors  ADL’s: No limitations  [place]: No limitations  Home exercise equipment: Stationary bike  Home exercise: Ebike  Other: None    Current  "Functional Status:  Occupation: full time job,  at [employer]  Recreation/Physical Activity: Spend time outdoors  ADL’s:limited by Chest Pain  [place]: limited by Chest Pain  Home exercise: Has not resumed at this time due to CR and work schedule  Other: Patient has returned to work full time/duty    Functional Capacity Screening Tool:  [facility] Activity Status Index:  4.64 METs      PSYCHOSOCIAL ASSESSMENT:    Depression screening:  PHQ-9 = 7    Interpretation:  5-9 = Mild Depression  Anxiety screening:  MARINO-7 = 10    Interpretation: 10-14 = Moderate anxiety    Pt self-report of depression and anxiety   Patient reports he is coping well with good social support and denies depression or anxiety      Self-reported stress level:  5/10   Stressors:  Ongoing health issues, return to work, finances  Stress Management Tools: practice Relaxation Techniques and keep a positive mindset    Quality of Life Screen:  (Higher score indicates disease impact on QOL)  [facility] COOP score: 31/40        Social Support:   spouse and friends  Community/Social Activities: Spend time with family and friends     Psychosocial Assessment as it relates to rehabilitation:   Patient denies issues with his family or home life that may affect their rehabilitation efforts.       NUTRITION ASSESSMENT:    Weight:    Wt Readings from Last 1 Encounters:   07/19/24 106 kg (233 lb)        Height:   Ht Readings from Last 1 Encounters:   07/19/24 5' 7.13\" (1.705 m)       Rate Your Plate Score: 42/81    Diabetes: N/A  A1c: No A1c in EPIC    Lipid management: Discussed diet and lipid management and Last lipid profile 4/27/2024  Chol 177    HDL 78  LDL 78      Current Dietary Habits:  Patient states he has removed sugar and sweets from his diet. Patient states he switched from table salt to sea salt but still doesn't use it often. He is trying to incorporate more whole grains into his diet.     Drug/Alcohol Use:   Occasionally " drinks Bud Lite      OTHER CORE COMPONENT ASSESSMENT:    Tobacco Use:     Pt quit in 2003   and has abstained. He avoids second hand smoke and other respiratory irritants..     Anginal Symptoms:  chest pressure and SOB   NTG use: Compliant with carrying NTG, Understands proper use, and Reviewed Proper use        INDIVIDUALIZED TREATMENT PLAN        EXERCISE GOAL and PLAN      SMART Goals:   10% improvement in functional capacity based on max METs achieved in initial fitness assessment  improved DASI score by 10%  increased exercise capacity by 40% based on peak METs tolerated in cardiac rehab exercise session  maintain > 150 minutes per week of moderate intensity exercise  Ability to work at a 5.0 MET Level or greater    Patient Specific EXERCISE GOALS:       resume ADLs with increased strength, return to work unrestricted, attend rehab regularly, and return to regular exercise regimen    Progress toward SMART and personal Exercise goals:Patient will be progressed towards goals as tolerated. Goals met: compliant fattening CR and returned to work full time/duty and Goals not met: has not resumed his HEP . He is still working at a 2.93 MET Level.    Plan for next 30 days:    Attend CR 2-3x/week, resume light exercise program at home, increase daily walking and decrease sitting time at home.     The patient was counseled on exercise guidelines to achieve a minimum of 150 mins/wk of moderate intensity (RPE 4-6)   exercise and encouraged to add 1-2 days of exercise on opposite days of cardiac rehab as tolerated.     Current Aerobic Exercise Prescription:      Frequency: 2 days/week   Supplement with home exercise 2+ days/wk as tolerated       Minutes: 45-50         METS: 2.93           HR:    RPE: 4-5/10         Modalities: Treadmill, UBE, NuStep, and Recumbent bike     Exercise workloads will be progressed gradually as tolerated, within limits of patient's ability, and according to the patient's response to the  exercise program.      Aerobic Exercise Prescription - 30 day goal:   Frequency: 2-3 days/week of cardiac rehab       Supplement with home exercise 2+ days/wk as tolerated    Minutes: 40-45       >150 mins/wk of moderate intensity exercise   METS: 2.93-4.5   HR:      RPE: 4-5/10   Modalities: Treadmill, UBE, NuStep, and Recumbent bike    Strength training:  Patient will  be introduced to strength training in this upcoming 30 day reporting window if he wishes.     Home Exercise: Has not returned to his HEP at this time due to work schedule and attending CR.     Group and Individual Education: benefit of exercise for CAD risk factors, home exercise guidelines, AHA guidelines to achieve >150 mins/wk of moderate exercise, RPE scale, and Group class: Risk Factors for Heart Disease     Readiness to change: Action:  (Changing behavior)      NUTRITION GOALS AND PLAN    Weight control:    Starting weight: 236   Current weight:  233      Nutritional   Reviewed patient's Rate your Plate. Discussed key elements of heart healthy eating. Reviewed patient goals for dietary modifications and their clinical implications.  Reviewed most recent lipid profile.     SMART Goals:   reduced BMI to < 25, fasting BG , Improved Rate Your Plate score  >58, and 2.5-5%  wt loss    Patient Specific NUTRITION GOALS:     Weight loss, improve diet, reduce daily sodium intake and increase daily fiber. Be more aware to drink water throughout he day.     Patient's progress toward SMART and personal Nutrition goals:    Patient will be progressed towards goals as tolerated. Patient is agreeable to making dietary modifications. There has been no significant  weight loss. He is in the process of reducing his daily sodium intake.     Plan for next 30 days:   Patient has received educational handouts on healthy eating and food label reading. Drink more water, reduce daily sodium to 2300 mg or less and increase daily fiber to 25-35 grams. He has  "been encouraged to reduce intake of saturated fats and increase intake of whole grains.     Measurable goals were based Rate Your Plate Dietary Self-Assessment. These are the areas in which the patient could score higher on the assessment.  Goals include recommendations for a heart healthy diet based on American Heart Association.    Group and Individual Education:   heart healthy eating principles  low sodium diet  maintaining hydration  group class: Heart Healthy Eating  group class:  Label Reading  \"Focus on Fiber: instruction    Readiness to change: Action:  (Changing behavior)      PSYCHOSOCIAL ASSESSMENT AND PLAN    Psychosocial Assessment as it relates to rehabilitation:   Patient denies issues with his/her family or home life that may affect their rehabilitation efforts.     SMART Goals:     Reduce perceived stress to 1-3/10, improved [facility] QOL < 27, feel less tired with more energy, Improved appetite control, and take time to relax    Patient Specific PSYCHOCOSOCIAL GOALS:     spend time with family, reduce stress, relax more and initiate a hobby to avoid triggers.     Patient's progress toward SMART and personal Psychosocial goals:    Patient will be progressed towards goals as tolerated. Stress level reduced to 5/10 from 7-8/10.     Plan for next 30 days:   Class: Stress and Your Health, Spend time outdoors, Keep a positive mindset, and Enjoy family. Reduce stress level to 5/10.CR staff to offer support as needed. Continue to take prescription of Cymbalta.     Group and Individual Education: stress management techniques, benefits of enrolling in [facility], depression and CAD, and class:  Stress and Your Health     Information to utilize [employer] was provided as well as contact information for counseling through  [facility]al [facility] and group psychotherapy groups available.    Readiness to change: Preparation:  (Getting ready to change)       OTHER CORE COMPONENTS GOALS and PLAN      Blood " Pressure will be monitored throughout the program and cardiologist will be notified of elevated trends.    Pt will be encouraged to monitor home BP if advised by cardiologist.    Tobacco Intervention:   Pt quit in 2003 and has abstained since quitting.  He avoids second hand smoke.     SMART Goals:   consistent, controlled resting BP < 130/80, reduced angina, medication compliance, and abstain from smoking    Patient Specific CORE COMPONENT GOALS:    reduced dietary sodium <2000mg, medication compliance, reduce number of medications  needed for BP control, and Abstain from smoking    Progress toward SMART and personal Core Component goals:    Patient will be progressed towards goals as tolerated. He remains smoke free. He is working towards a consistent resting BP < 130/80.     Plan for next 30 days:   group class: Understanding Heart Disease, group class: Common Heart Medications, medication compliance, avoid places with second hand smoke, and monitor home BP    Group and Individual Education:  understanding high blood pressure and it's relationship to CAD, components of blood pressure management, proper use of sublingual NTG, group class: Understanding Heart Disease, and group class:  Common Heart Medications. Patient has been educated on the AHA BP categories.     Readiness to change: Action:  (Changing behavior)

## 2024-08-26 ENCOUNTER — APPOINTMENT (OUTPATIENT)
Dept: CARDIAC REHAB | Facility: HOSPITAL | Age: 69
End: 2024-08-26
Payer: COMMERCIAL

## 2024-08-28 ENCOUNTER — APPOINTMENT (OUTPATIENT)
Dept: CARDIAC REHAB | Facility: HOSPITAL | Age: 69
End: 2024-08-28
Payer: COMMERCIAL

## 2024-08-28 ENCOUNTER — CLINICAL SUPPORT (OUTPATIENT)
Dept: CARDIAC REHAB | Facility: HOSPITAL | Age: 69
End: 2024-08-28
Payer: COMMERCIAL

## 2024-08-28 DIAGNOSIS — Z98.890 S/P CARDIAC CATH: ICD-10-CM

## 2024-08-28 PROCEDURE — 93798 PHYS/QHP OP CAR RHAB W/ECG: CPT

## 2024-08-30 ENCOUNTER — APPOINTMENT (OUTPATIENT)
Dept: CARDIAC REHAB | Facility: HOSPITAL | Age: 69
End: 2024-08-30
Payer: COMMERCIAL

## 2024-09-02 ENCOUNTER — APPOINTMENT (OUTPATIENT)
Dept: CARDIAC REHAB | Facility: HOSPITAL | Age: 69
End: 2024-09-02
Payer: COMMERCIAL

## 2024-09-04 ENCOUNTER — APPOINTMENT (OUTPATIENT)
Dept: CARDIAC REHAB | Facility: HOSPITAL | Age: 69
End: 2024-09-04
Payer: COMMERCIAL

## 2024-09-09 ENCOUNTER — CLINICAL SUPPORT (OUTPATIENT)
Dept: CARDIAC REHAB | Facility: HOSPITAL | Age: 69
End: 2024-09-09
Payer: COMMERCIAL

## 2024-09-09 DIAGNOSIS — Z98.890 S/P CARDIAC CATH: ICD-10-CM

## 2024-09-09 PROCEDURE — 93798 PHYS/QHP OP CAR RHAB W/ECG: CPT

## 2024-09-11 ENCOUNTER — CLINICAL SUPPORT (OUTPATIENT)
Dept: CARDIAC REHAB | Facility: HOSPITAL | Age: 69
End: 2024-09-11
Payer: COMMERCIAL

## 2024-09-11 DIAGNOSIS — Z98.890 S/P CARDIAC CATH: ICD-10-CM

## 2024-09-11 PROCEDURE — 93798 PHYS/QHP OP CAR RHAB W/ECG: CPT

## 2024-09-16 ENCOUNTER — CLINICAL SUPPORT (OUTPATIENT)
Dept: CARDIAC REHAB | Facility: HOSPITAL | Age: 69
End: 2024-09-16
Payer: COMMERCIAL

## 2024-09-16 DIAGNOSIS — Z98.890 S/P CARDIAC CATH: ICD-10-CM

## 2024-09-16 PROCEDURE — 93798 PHYS/QHP OP CAR RHAB W/ECG: CPT

## 2024-09-18 ENCOUNTER — CLINICAL SUPPORT (OUTPATIENT)
Dept: CARDIAC REHAB | Facility: HOSPITAL | Age: 69
End: 2024-09-18
Payer: COMMERCIAL

## 2024-09-18 DIAGNOSIS — Z98.890 S/P CARDIAC CATH: Primary | ICD-10-CM

## 2024-09-18 PROCEDURE — 93798 PHYS/QHP OP CAR RHAB W/ECG: CPT

## 2024-09-18 NOTE — PROGRESS NOTES
CARDIAC REHABILITATION ASSESSMENT AND INDIVIDUALIZED TREATMENT PLAN  Discharge Summary          Today's date: 2024   # of Exercise Sessions Completed: 25  Patient name: Carlo Samuel      : 1955  Age: 69 y.o.       MRN: [id number]  Referring Physician: Corrie Sherman CRNP  Cardiologist: Dr. Chuy Rajput MD  Provider: [place]  Clinician: Erika Sacks, MS        Comments: Carlo has completed 25 sessions of cardiac rehab. He requested to be discharged early due to the time commitment between work and helping take care of his mother in law. He was working at a 2.93 MET level and increased his submax ETT from 1.84 METs to 2.87 METs. His MET level was limited by his orthopedic issues. He was encouraged to continue exercising in a phase III setting and to reach out to the CR department if they can help him in any way.         Dx: S/P Cardiac Cath    Description of Diagnosis: Cardiac catheterization w/ no intervention. Mid LAD 65% stenosed, Left Cx 40% stenosed, and Prox RCA 10% stenoses    Date of onset: 2024    Other Cardiac History: HTN, HLD, SURI to RCA in 2009          ASSESSMENT    Medical History:   Past Medical History:   Diagnosis Date    Coronary artery disease     s/p stenting    History of epidural anesthesia     back L3/L4    Hyperlipidemia     Hypertension        Family History:  Family History   Problem Relation Age of Onset    Rheumatic fever Father     Heart disease Father     Cancer Father     Lung cancer Father        Allergies:   Patient has no known allergies.    Current Medications:   Current Outpatient Medications   Medication Sig Dispense Refill    amLODIPine (NORVASC) 5 mg tablet Take 1 tablet (5 mg total) by mouth daily 90 tablet 3    aspirin 81 MG tablet Take 81 mg by mouth daily       b complex vitamins capsule Take 1 capsule by mouth daily      Calcium-Magnesium-Vitamin D (CALCIUM MAGNESIUM PO) Take by mouth      DULoxetine (CYMBALTA) 20 mg capsule Take 20 mg by  mouth every evening      DULoxetine (CYMBALTA) 60 mg delayed release capsule Take 60 mg by mouth daily      gabapentin (NEURONTIN) 300 mg capsule Take 300 mg by mouth 3 (three) times a day      isosorbide mononitrate (IMDUR) 30 mg 24 hr tablet Take 1 tablet (30 mg total) by mouth daily 90 tablet 3    metoprolol succinate (TOPROL-XL) 25 mg 24 hr tablet Take 1 tablet (25 mg total) by mouth daily 90 tablet 3    Multiple Vitamin (MULTIVITAMIN) tablet Take 1 tablet by mouth daily      nitroglycerin (NITROSTAT) 0.4 mg SL tablet Place 1 tablet (0.4 mg total) under the tongue every 5 (five) minutes as needed for chest pain 25 tablet 3    rosuvastatin (CRESTOR) 40 MG tablet Take 1 tablet (40 mg total) by mouth daily 90 tablet 3    tamsulosin (FLOMAX) 0.4 mg Take 0.8 mg by mouth daily with dinner      tiZANidine (ZANAFLEX) 4 mg tablet Take 4 mg by mouth daily at bedtime       No current facility-administered medications for this visit.       Medication compliance: Yes   Comments: Pt reports to be compliant with medications    Physical Limitations: BLE neuropathy, R knee replacement, SOB    Fall Risk: Low   Comments: Ambulates with a steady gait with no assist device    Cultural needs: None      CAD Risk Factors:  Cholesterol: Yes (Chart states HLD but last lipid panel WNL)  HTN: Yes  DM: No  Obesity: Yes (BMI 34.85 kg/m2)   8/22/2024: BMI has gone up to 36.36 kg/m2)  Inactivity: Yes      EXERCISE ASSESSMENT:    Initial Fitness Assessment:    Rest: HR 57, /66, SpO2 94%, Asx  Exercise: , /68, SpO2 97%, Asx, 1.84 METs   Reason for Discontinuation: Heart rate response and RPE 6  Recovery: HR 66, /60, SpO2 98%, Asx  ECG Interpretation: NSR    Final Fitness Assessment:  Rest: HR 69, /54, Asx  Exercise: , /64, Asx, 2.87 METs   Reason for Discontinuation: HR 30 bpm above rest (also limited by orthopedic issues)  Recovery: HR 73, /56, Asx      Functional Status Prior to Diagnosis for  "Treatment:   Occupation: full time job,  at [employer]  Recreation/Physical Activity: Spend time outdoors  ADL’s: No limitations  [place]: No limitations  Home exercise equipment: Stationary bike  Home exercise: Ebike  Other: None    Current Functional Status:  Occupation: full time job,  at [employer]  Recreation/Physical Activity: Spend time outdoors  ADL’s:limited by Chest Pain  [place]: limited by Chest Pain  Home exercise: Has not resumed at this time due to CR and work schedule  Other: Patient has returned to work full time/duty    Functional Capacity Screening Tool:  [facility] Activity Status Index:  4.64 METs at initial evaluation, 8.91 METs at discharge      PSYCHOSOCIAL ASSESSMENT:    Depression screening:  PHQ-9 = 7    Interpretation:  5-9 = Mild Depression  Final PHQ9 Score: 1  Anxiety screening:  MARINO-7 = 10    Interpretation: 10-14 = Moderate anxiety  Final GAD7 score: 0    Pt self-report of depression and anxiety   Patient continues to report he is coping well with good social support and denies depression or anxiety.       Self-reported stress level:  5/10   Stressors: Patient stress level remains elevated at 5/10 due to helping care for his mother in law  Stress Management Tools: practice Relaxation Techniques and keep a positive mindset    Quality of Life Screen:  (Higher score indicates disease impact on QOL)  [facility] COOP score: 31/40 at initial evaluation, 16/40 at discharge       Social Support:   spouse and friends  Community/Social Activities: Spend time with family and friends     Psychosocial Assessment as it relates to rehabilitation:   Patient denies issues with his family or home life that may affect their rehabilitation efforts.       NUTRITION ASSESSMENT:    Weight:    Wt Readings from Last 1 Encounters:   07/19/24 106 kg (233 lb)        Height:   Ht Readings from Last 1 Encounters:   07/19/24 5' 7.13\" (1.705 m)       Rate Your Plate " Score: 42/81    Diabetes: N/A  A1c: No A1c in EPIC    Lipid management: Discussed diet and lipid management and Last lipid profile 4/27/2024  Chol 177    HDL 78  LDL 78      Current Dietary Habits:  Patient states he has removed sugar and sweets from his diet. Patient states he switched from table salt to sea salt but still doesn't use it often. He is trying to incorporate more whole grains into his diet.     Drug/Alcohol Use:   Occasionally drinks Bud Lite      OTHER CORE COMPONENT ASSESSMENT:    Tobacco Use:     Pt quit in 2003   and has abstained. He avoids second hand smoke and other respiratory irritants.     Anginal Symptoms:  chest pressure and SOB   NTG use: Compliant with carrying NTG, Understands proper use, and Reviewed Proper use        INDIVIDUALIZED TREATMENT PLAN        EXERCISE GOAL and PLAN      SMART Goals:   10% improvement in functional capacity based on max METs achieved in initial fitness assessment  improved DASI score by 10%  increased exercise capacity by 40% based on peak METs tolerated in cardiac rehab exercise session  maintain > 150 minutes per week of moderate intensity exercise  Ability to work at a 5.0 MET Level or greater    Patient Specific EXERCISE GOALS:       resume ADLs with increased strength, return to work unrestricted, attend rehab regularly, and return to regular exercise regimen    Progress toward SMART and personal Exercise goals: Carlo completed 25 sessions of cardiac rehab. He worked at a 2.93 MET level. His submax ETT improved from 1.84 METs to 2.87 METs. His MET level was limited by his orthopedic issues. His DASI score improved from 15.45 (4.64 METs) to 50.2 (8.91 METs). He returned to work full time without issues. He is going to begin going to the gym following his discharge.     The patient was counseled on exercise guidelines to achieve a minimum of 150 mins/wk of moderate intensity (RPE 4-6)   exercise and encouraged to add 1-2 days of exercise on  opposite days of cardiac rehab as tolerated.     Current Aerobic Exercise Prescription:      Frequency: 2 days/week   Supplement with home exercise 2+ days/wk as tolerated       Minutes: 45-50         METS: 2.93           HR:    RPE: 4-5/10         Modalities: Treadmill, UBE, and NuStep       Strength training: Patient did not strength train.    Home Exercise: He has not returned to his HEP yet but will be going to the gym now that he is discharged from CR.     Group and Individual Education: benefit of exercise for CAD risk factors, home exercise guidelines, AHA guidelines to achieve >150 mins/wk of moderate exercise, RPE scale, and Group class: Risk Factors for Heart Disease     Readiness to change: Maintenance: (Maintaining the behavior change)      NUTRITION GOALS AND PLAN    Weight control:    Starting weight: 236   Current weight:  233      Nutritional   Reviewed patient's Rate your Plate. Discussed key elements of heart healthy eating. Reviewed patient goals for dietary modifications and their clinical implications.  Reviewed most recent lipid profile.     SMART Goals:   reduced BMI to < 25, fasting BG , Improved Rate Your Plate score  >58, and 2.5-5%  wt loss    Patient Specific NUTRITION GOALS:     Weight loss, improve diet, reduce daily sodium intake and increase daily fiber. Be more aware to drink water throughout he day.     Patient's progress toward SMART and personal Nutrition goals:    Carlo had 3# weight loss since beginning CR. His RYP score improved from 42 to 72. He did not have a follow up lipid panel or CMP.     Measurable goals were based Rate Your Plate Dietary Self-Assessment. These are the areas in which the patient could score higher on the assessment.  Goals include recommendations for a heart healthy diet based on American Heart Association.    Group and Individual Education:   heart healthy eating principles  low sodium diet  maintaining hydration  group class: Heart Healthy  "Eating  group class:  Label Reading  \"Focus on Fiber: instruction    Readiness to change: Maintenance: (Maintaining the behavior change)      PSYCHOSOCIAL ASSESSMENT AND PLAN    Psychosocial Assessment as it relates to rehabilitation:   Patient denies issues with his/her family or home life that may affect their rehabilitation efforts.     SMART Goals:     Reduce perceived stress to 1-3/10, improved [facility] QOL < 27, feel less tired with more energy, Improved appetite control, and take time to relax    Patient Specific PSYCHOCOSOCIAL GOALS:     spend time with family, reduce stress, relax more and initiate a hobby to avoid triggers.     Patient's progress toward SMART and personal Psychosocial goals:    His stress level remains slightly elevated at 5/10 due to helping care for his mother in law. His [place] score improved from 31 to 16. His PHQ9 improved from 7 to 1.     Plan for next 30 days:   Class: Stress and Your Health, Spend time outdoors, Keep a positive mindset, and Enjoy family. Reduce stress level to 5/10.CR staff to offer support as needed. Continue to take prescription of Cymbalta.     Group and Individual Education: stress management techniques, benefits of enrolling in [employer], depression and CAD, and class:  Stress and Your Health     Information to utilize [employer] was provided as well as contact information for counseling through  Behavioral [facility] and group psychotherapy groups available.    Readiness to change: Action:  (Changing behavior)      OTHER CORE COMPONENTS GOALS and PLAN      Blood Pressure will be monitored throughout the program and cardiologist will be notified of elevated trends.    Pt will be encouraged to monitor home BP if advised by cardiologist.    Tobacco Intervention:   Pt quit in 2003 and has abstained since quitting.  He avoids second hand smoke.     SMART Goals:   consistent, controlled resting BP < 130/80, reduced angina, medication compliance, and abstain " from smoking    Patient Specific CORE COMPONENT GOALS:    reduced dietary sodium <2000mg, medication compliance, reduce number of medications  needed for BP control, and Abstain from smoking    Progress toward SMART and personal Core Component goals:    Carlo continues to abstain from smoking. He has been working to reduce his sodium intake and increase his hydration. His resting BPs were consistently < 130/80.    Plan for next 30 days:   group class: Understanding Heart Disease, group class: Common Heart Medications, medication compliance, avoid places with second hand smoke, and monitor home BP    Group and Individual Education:  understanding high blood pressure and it's relationship to CAD, components of blood pressure management, proper use of sublingual NTG, group class: Understanding Heart Disease, and group class:  Common Heart Medications. Patient has been educated on the AHA BP categories.     Readiness to change: Maintenance: (Maintaining the behavior change)

## 2024-09-23 ENCOUNTER — APPOINTMENT (OUTPATIENT)
Dept: CARDIAC REHAB | Facility: HOSPITAL | Age: 69
End: 2024-09-23
Payer: COMMERCIAL

## 2024-09-25 ENCOUNTER — APPOINTMENT (OUTPATIENT)
Dept: CARDIAC REHAB | Facility: HOSPITAL | Age: 69
End: 2024-09-25
Payer: COMMERCIAL

## 2024-09-30 ENCOUNTER — APPOINTMENT (OUTPATIENT)
Dept: CARDIAC REHAB | Facility: HOSPITAL | Age: 69
End: 2024-09-30
Payer: COMMERCIAL

## 2024-10-23 ENCOUNTER — OFFICE VISIT (OUTPATIENT)
Dept: CARDIOLOGY CLINIC | Facility: CLINIC | Age: 69
End: 2024-10-23
Payer: COMMERCIAL

## 2024-10-23 VITALS
HEART RATE: 54 BPM | WEIGHT: 232 LBS | HEIGHT: 69 IN | BODY MASS INDEX: 34.36 KG/M2 | SYSTOLIC BLOOD PRESSURE: 106 MMHG | DIASTOLIC BLOOD PRESSURE: 70 MMHG

## 2024-10-23 DIAGNOSIS — E78.2 MIXED HYPERLIPIDEMIA: ICD-10-CM

## 2024-10-23 DIAGNOSIS — I20.89 STABLE ANGINA (HCC): ICD-10-CM

## 2024-10-23 DIAGNOSIS — I25.10 CORONARY ARTERY DISEASE INVOLVING NATIVE CORONARY ARTERY OF NATIVE HEART WITHOUT ANGINA PECTORIS: Primary | ICD-10-CM

## 2024-10-23 PROCEDURE — 99214 OFFICE O/P EST MOD 30 MIN: CPT | Performed by: NURSE PRACTITIONER

## 2024-10-23 NOTE — PROGRESS NOTES
Patient ID: Carlo Samuel is a 69 y.o. male.        Plan:      Assessment & Plan  Coronary artery disease involving native coronary artery of native heart without angina pectoris  S/p SURI to pRCA and D2 6/2009 patent on recent cath  Mid-LAD lesion noted-GDMT and future PCI if refractory CP  Continue asa, statin, nitrates, BB, CCB  Add Imdur  Stable angina (HCC)  Stable with nitrate/BB/CCB  Add Imdur  Mixed hyperlipidemia  Continue Crestor 40 mg daily         Follow up Plan/Summary Comments:  Carlo continues to experience symptoms when he pushes himself.  This sounds consistent with stable angina.  Will trial Imdur 30 mg once daily.    He will continue to monitor symptoms and let me know if there are any changes.  Additionally, if he experiences symptoms with less effort, he will let us know.    Continue current medications.    Follow-up in 3 months or sooner if needed.    HPI: Carlo presented today for a routine visit.    Several months ago, Carlo was evaluated for chest discomfort and shortness of breath.  He ultimately underwent cardiac catheterization.  He was found to have a mid LAD lesion for which GDM I was recommended including cardiac rehab.    Since his last visit with me in July, Carlo has completed cardiac rehab.  Overall, he feels well.  He had some questions about his target heart rate which we discussed and reviewed the paperwork given him a cardiac rehab.    He does still experience the tightness in his throat and sweating when he overexerts himself such as when cutting the grass.  Symptoms resolved promptly with rest.  No symptoms with less exertion.    He denies any shortness of breath or exertional dyspnea.  He has occasional episodes of lightheadedness if he stands up too quickly.    Review of Systems   10  point ROS  was otherwise non pertinent or negative except as per HPI or as below.   Gait: Normal      Most recent or relevant cardiac/vascular testing:    Cardiac catheterization  "5/23/2024   Mid LAD with a hemodynamically significant, calcified lesion warranting GDMT initiation and PCI with likely IVL if refractory CP    Prox RCA with patent prior 2009 Stent and only mild ISR    LVEDP is normal without gradient with minimal gradient on LV-AO pullback    Echo 5/23/2024  EF 49%, moderate diastolic dysfunction  Mild LAE  Mildly dilated aortic root, 4.1 cm      Objective:     /70   Pulse (!) 54   Ht 5' 9\" (1.753 m)   Wt 105 kg (232 lb)   BMI 34.26 kg/m²     PHYSICAL EXAM:    General:  Normal appearance, no acute distress  Eyes:  Anicteric.  Oral mucosa:  Moist.  Neck:  No JVD. Carotid upstrokes are brisk without bruits.  No masses.  Chest:  Clear to auscultation   Cardiac:  No palpable PMI.  Normal S1 and S2.  No murmur gallop or rub.  Abdomen:  Soft and nontender. No palpable organomegaly or aortic enlargement.  Extremities:  No peripheral edema.  Musculoskeletal:  Symmetric.   Vascular:  Pedal pulses are intact.  Neuro:  Grossly symmetric.  Psych:  Alert and oriented x3.    No Known Allergies    Current Outpatient Medications:     amLODIPine (NORVASC) 5 mg tablet, Take 1 tablet (5 mg total) by mouth daily, Disp: 90 tablet, Rfl: 3    aspirin 81 MG tablet, Take 81 mg by mouth daily , Disp: , Rfl:     Calcium-Magnesium-Vitamin D (CALCIUM MAGNESIUM PO), Take by mouth, Disp: , Rfl:     DULoxetine (CYMBALTA) 20 mg capsule, Take 20 mg by mouth every evening, Disp: , Rfl:     DULoxetine (CYMBALTA) 60 mg delayed release capsule, Take 60 mg by mouth daily, Disp: , Rfl:     gabapentin (NEURONTIN) 300 mg capsule, Take 300 mg by mouth daily, Disp: , Rfl:     metoprolol succinate (TOPROL-XL) 25 mg 24 hr tablet, Take 1 tablet (25 mg total) by mouth daily (Patient taking differently: Take 12.5 mg by mouth daily), Disp: 90 tablet, Rfl: 3    Multiple Vitamin (MULTIVITAMIN) tablet, Take 1 tablet by mouth daily, Disp: , Rfl:     nitroglycerin (NITROSTAT) 0.4 mg SL tablet, Place 1 tablet (0.4 mg total) " under the tongue every 5 (five) minutes as needed for chest pain, Disp: 25 tablet, Rfl: 3    rosuvastatin (CRESTOR) 40 MG tablet, Take 1 tablet (40 mg total) by mouth daily, Disp: 90 tablet, Rfl: 3    tamsulosin (FLOMAX) 0.4 mg, Take 0.8 mg by mouth daily with dinner, Disp: , Rfl:     b complex vitamins capsule, Take 1 capsule by mouth daily (Patient not taking: Reported on 10/23/2024), Disp: , Rfl:     isosorbide mononitrate (IMDUR) 30 mg 24 hr tablet, Take 1 tablet (30 mg total) by mouth daily, Disp: 90 tablet, Rfl: 3    tiZANidine (ZANAFLEX) 4 mg tablet, Take 4 mg by mouth daily at bedtime (Patient not taking: Reported on 10/23/2024), Disp: , Rfl:   Past Medical History:   Diagnosis Date    Coronary artery disease     s/p stenting    History of epidural anesthesia     back L3/L4    Hyperlipidemia     Hypertension      Past Surgical History:   Procedure Laterality Date    ANTERIOR FUSION LUMBAR SPINE      BACK SURGERY      CARDIAC CATHETERIZATION Left 5/23/2024    Procedure: Cardiac catheterization;  Surgeon: Jenifer Avendano DO;  Location: [facility];  Service: Cardiology    CARDIAC CATHETERIZATION N/A 5/23/2024    Procedure: Cardiac Coronary Angiogram;  Surgeon: Jenifer Avendano DO;  Location: [facility];  Service: Cardiology    CARDIAC CATHETERIZATION N/A 5/23/2024    Procedure: Cardiac FFR/IFR;  Surgeon: Jenifer Avendano DO;  Location: [facility];  Service: Cardiology    CARPAL TUNNEL RELEASE      CORONARY ANGIOPLASTY WITH STENT PLACEMENT  06/23/2009    SURI to 95% prox RCA and 99% D2.    INGUINAL HERNIA REPAIR      NASAL TURBINATE REDUCTION Bilateral 10/14/2019    with outfracture - office procedure - Dr. Chapman    REPLACEMENT TOTAL KNEE      SHOULDER ARTHROPLASTY         CMP:   Lab Results   Component Value Date    K 4.5 04/27/2024     04/27/2024    CO2 27 04/27/2024    BUN 14 04/27/2024    CREATININE 0.81 04/27/2024    EGFR 89 04/27/2024     Lipid Profile:    Lab  Results   Component Value Date    TRIG 104 2024    HDL 78 2024         Social History     Tobacco Use   Smoking Status Former    Current packs/day: 0.00    Types: Cigarettes    Quit date:     Years since quittin.8   Smokeless Tobacco Never

## 2024-10-23 NOTE — ASSESSMENT & PLAN NOTE
S/p SURI to pRCA and D2 6/2009 patent on recent cath  Mid-LAD lesion noted-GDMT and future PCI if refractory CP  Continue asa, statin, nitrates, BB, CCB  Add Imdur

## 2024-10-23 NOTE — PATIENT INSTRUCTIONS
Try isosorbide 30 mg. May affect blood pressure so let us know if you have lightheadedness or dizziness

## 2024-11-27 DIAGNOSIS — I25.10 CORONARY ARTERY DISEASE INVOLVING NATIVE CORONARY ARTERY OF NATIVE HEART WITHOUT ANGINA PECTORIS: ICD-10-CM

## 2024-12-02 DIAGNOSIS — I25.10 CORONARY ARTERY DISEASE INVOLVING NATIVE CORONARY ARTERY OF NATIVE HEART WITHOUT ANGINA PECTORIS: ICD-10-CM

## 2024-12-02 RX ORDER — METOPROLOL SUCCINATE 25 MG/1
25 TABLET, EXTENDED RELEASE ORAL DAILY
Qty: 90 TABLET | Refills: 3 | Status: SHIPPED | OUTPATIENT
Start: 2024-12-02

## 2024-12-02 NOTE — TELEPHONE ENCOUNTER
Patients pharmacy is stating that tried to get in contact with us but we have not received anything. Resending patients medication    Reason for call:   [x] Refill   [] Prior Auth  [] Other:     Office:   [] PCP/Provider -   [x] Specialty/Provider - Cardio     Medication:   Metoprolol 25mg- take 1 tablet by mouth daily      Pharmacy: [facility]    Does the patient have enough for 3 days?   [] Yes   [x] No - Send as HP to POD

## 2024-12-04 RX ORDER — ROSUVASTATIN CALCIUM 40 MG/1
40 TABLET, COATED ORAL DAILY
Qty: 90 TABLET | Refills: 3 | Status: SHIPPED | OUTPATIENT
Start: 2024-12-04

## 2024-12-04 RX ORDER — ROSUVASTATIN CALCIUM 40 MG/1
40 TABLET, COATED ORAL DAILY
Qty: 90 TABLET | Refills: 3 | Status: SHIPPED | OUTPATIENT
Start: 2024-12-04 | End: 2024-12-10 | Stop reason: SDUPTHER

## 2024-12-10 DIAGNOSIS — I25.10 CORONARY ARTERY DISEASE INVOLVING NATIVE CORONARY ARTERY OF NATIVE HEART WITHOUT ANGINA PECTORIS: ICD-10-CM

## 2024-12-10 RX ORDER — ROSUVASTATIN CALCIUM 40 MG/1
40 TABLET, COATED ORAL DAILY
Qty: 90 TABLET | Refills: 3 | Status: SHIPPED | OUTPATIENT
Start: 2024-12-10

## 2025-01-24 ENCOUNTER — TELEPHONE (OUTPATIENT)
Dept: CARDIOLOGY CLINIC | Facility: CLINIC | Age: 70
End: 2025-01-24

## 2025-01-24 ENCOUNTER — OFFICE VISIT (OUTPATIENT)
Dept: CARDIOLOGY CLINIC | Facility: CLINIC | Age: 70
End: 2025-01-24
Payer: COMMERCIAL

## 2025-01-24 VITALS
OXYGEN SATURATION: 95 % | SYSTOLIC BLOOD PRESSURE: 120 MMHG | RESPIRATION RATE: 18 BRPM | DIASTOLIC BLOOD PRESSURE: 68 MMHG | HEART RATE: 76 BPM | HEIGHT: 67 IN | BODY MASS INDEX: 37.2 KG/M2 | WEIGHT: 237 LBS

## 2025-01-24 DIAGNOSIS — I25.10 CORONARY ARTERY DISEASE INVOLVING NATIVE CORONARY ARTERY OF NATIVE HEART WITHOUT ANGINA PECTORIS: Primary | ICD-10-CM

## 2025-01-24 DIAGNOSIS — E78.2 MIXED HYPERLIPIDEMIA: ICD-10-CM

## 2025-01-24 DIAGNOSIS — I20.89 STABLE ANGINA (HCC): ICD-10-CM

## 2025-01-24 DIAGNOSIS — I20.89 STABLE ANGINA (HCC): Primary | ICD-10-CM

## 2025-01-24 DIAGNOSIS — I25.10 CORONARY ARTERY DISEASE INVOLVING NATIVE CORONARY ARTERY OF NATIVE HEART WITHOUT ANGINA PECTORIS: ICD-10-CM

## 2025-01-24 PROCEDURE — 99214 OFFICE O/P EST MOD 30 MIN: CPT | Performed by: NURSE PRACTITIONER

## 2025-01-24 RX ORDER — HYDROXYZINE HYDROCHLORIDE 10 MG/1
10 TABLET, FILM COATED ORAL 2 TIMES DAILY PRN
COMMUNITY
Start: 2024-12-30

## 2025-01-24 NOTE — H&P (VIEW-ONLY)
Patient ID: Carlo Samuel is a 69 y.o. male.        Plan:      Assessment & Plan  Coronary artery disease involving native coronary artery of native heart without angina pectoris  S/p SURI to pRCA and D2 6/2009 patent on recent cath  Mid-LAD lesion noted-GDMT and future PCI if refractory CP  Continue asa, statin, nitrates, BB, CCB    Mixed hyperlipidemia  Continue Crestor 40 mg daily     Stable angina (HCC)  Refractory symptoms despite beta-blocker, calcium blocker, and nitrates  Referred for cath      Follow up Plan/Summary Comments:  Given refractory symptoms, will arrange for cardiac catheterization    Carlo knows to seek medical attention for any symptoms not relieved with rest or nitro.    Will arrange for follow-up in 1 month, after his cath.    HPI: Carlo is seen in the office today for a routine visit.      He was last seen in Oct 2023.  He described symptoms consistent with stable angina and isosorbide was prescribed.    Since that time, Carlo unfortunately continues to experience chest and throat discomfort when he exerts himself.  Despite the initiation of isosorbide, he actually notices symptoms now with less effort that prior.      In May of last year, he underwent cardiac catheterization for similar symptoms.  He was noted to have an LAD lesion that was not intervened on.      Review of Systems   10  point ROS  was otherwise non pertinent or negative except as per HPI or as below.   Gait: Normal      Most recent or relevant cardiac/vascular testing:    Cardiac catheterization 5/23/2024   Mid LAD with a hemodynamically significant, calcified lesion warranting GDMT initiation and PCI with likely IVL if refractory CP    Prox RCA with patent prior 2009 Stent and only mild ISR    LVEDP is normal without gradient with minimal gradient on LV-AO pullback     Echo 5/23/2024  EF 49%, moderate diastolic dysfunction  Mild LAE  Mildly dilated aortic root, 4.1 cm       Objective:     /68 (BP Location:  "Left arm, Patient Position: Sitting, Cuff Size: Large)   Pulse 76   Resp 18   Ht 5' 7.32\" (1.71 m)   Wt 108 kg (237 lb)   SpO2 95%   BMI 36.76 kg/m²     PHYSICAL EXAM:    General:  Normal appearance, no acute distress  Eyes:  Anicteric.  Oral mucosa:  Moist.  Neck:  No JVD. Carotid upstrokes are brisk without bruits.  No masses.  Chest:  Clear to auscultation   Cardiac:  No palpable PMI.  Normal S1 and S2.  No murmur gallop or rub.  Abdomen:  Soft and nontender. No palpable organomegaly or aortic enlargement.  Extremities:  No peripheral edema.  Musculoskeletal:  Symmetric.   Vascular:  Pedal pulses are intact.  Neuro:  Grossly symmetric.  Psych:  Alert and oriented x3.    No Known Allergies    Current Outpatient Medications:     hydrOXYzine HCL (ATARAX) 10 mg tablet, Take 10 mg by mouth 2 (two) times a day as needed for itching, allergies or anxiety, Disp: , Rfl:     amLODIPine (NORVASC) 5 mg tablet, Take 1 tablet (5 mg total) by mouth daily, Disp: 90 tablet, Rfl: 3    aspirin 81 MG tablet, Take 81 mg by mouth daily , Disp: , Rfl:     Calcium-Magnesium-Vitamin D (CALCIUM MAGNESIUM PO), Take by mouth, Disp: , Rfl:     DULoxetine (CYMBALTA) 20 mg capsule, Take 20 mg by mouth every evening, Disp: , Rfl:     DULoxetine (CYMBALTA) 60 mg delayed release capsule, Take 60 mg by mouth daily, Disp: , Rfl:     gabapentin (NEURONTIN) 300 mg capsule, Take 300 mg by mouth daily, Disp: , Rfl:     isosorbide mononitrate (IMDUR) 30 mg 24 hr tablet, Take 1 tablet (30 mg total) by mouth daily, Disp: 90 tablet, Rfl: 3    metoprolol succinate (TOPROL-XL) 25 mg 24 hr tablet, Take 1 tablet (25 mg total) by mouth daily, Disp: 90 tablet, Rfl: 3    Multiple Vitamin (MULTIVITAMIN) tablet, Take 1 tablet by mouth daily, Disp: , Rfl:     nitroglycerin (NITROSTAT) 0.4 mg SL tablet, Place 1 tablet (0.4 mg total) under the tongue every 5 (five) minutes as needed for chest pain, Disp: 25 tablet, Rfl: 3    rosuvastatin (CRESTOR) 40 MG tablet, " Take 1 tablet (40 mg total) by mouth daily, Disp: 90 tablet, Rfl: 3    rosuvastatin (CRESTOR) 40 MG tablet, Take 1 tablet (40 mg total) by mouth daily, Disp: 90 tablet, Rfl: 3    tamsulosin (FLOMAX) 0.4 mg, Take 0.8 mg by mouth daily with dinner, Disp: , Rfl:     tiZANidine (ZANAFLEX) 4 mg tablet, Take 4 mg by mouth daily at bedtime (Patient not taking: Reported on 10/23/2024), Disp: , Rfl:   Past Medical History:   Diagnosis Date    Coronary artery disease     s/p stenting    History of epidural anesthesia     back L3/L4    Hyperlipidemia     Hypertension      Past Surgical History:   Procedure Laterality Date    ANTERIOR FUSION LUMBAR SPINE      BACK SURGERY      CARDIAC CATHETERIZATION Left 2024    Procedure: Cardiac catheterization;  Surgeon: Jenifer Avendano DO;  Location[facility];  Service: Cardiology    CARDIAC CATHETERIZATION N/A 2024    Procedure: Cardiac Coronary Angiogram;  Surgeon: Jenifer Avendano DO;  Location[facility];  Service: Cardiology    CARDIAC CATHETERIZATION N/A 2024    Procedure: Cardiac FFR/IFR;  Surgeon: Jenifer Avendano DO;  Location[facility];  Service: Cardiology    CARPAL TUNNEL RELEASE      CORONARY ANGIOPLASTY WITH STENT PLACEMENT  2009    SURI to 95% prox RCA and 99% D2.    INGUINAL HERNIA REPAIR      NASAL TURBINATE REDUCTION Bilateral 10/14/2019    with outfracture - office procedure - Dr. Chapman    REPLACEMENT TOTAL KNEE      SHOULDER ARTHROPLASTY         CMP:   Lab Results   Component Value Date    K 4.5 2024     2024    CO2 27 2024    BUN 14 2024    CREATININE 0.81 2024    EGFR 89 2024     Lipid Profile:    Lab Results   Component Value Date    TRIG 104 2024    HDL 78 2024         Social History     Tobacco Use   Smoking Status Former    Current packs/day: 0.00    Types: Cigarettes    Quit date:     Years since quittin.0   Smokeless Tobacco Never

## 2025-01-24 NOTE — LETTER
2025       Carlo Samuel              : 1955        MRN: [id number]  [address] [facility] [address]     Procedure Name:   CARDIAC  CATHETERIZATION    Procedure date: 2025    Procedure Location:   [place]: [address] [place] [id number] - Please call to Short Stay Center [phone] if not get call by 5.00pm.     Blood work to be done BEFORE 2025    Special Instructions:    Medication hold:   Continue taking all your morning medications as prescribed.     The hospital will contact you the day prior to your procedure, usually between 4PM - 6PM to instruct you on the time and place to report. If you do not hear from a [facility]'s  by 6PM the evening prior to your procedure, please contact the campus that you are scheduled at.       You may have NOTHING to eat or drink from midnight the night prior to your procedure including candy & gum.  You may have a SIP of water with your morning medications.   DO NOT stop taking Plavix or Aspirin unless advised otherwise.      Arrange for a responsible person to drive you to and from the hospital.    Please notify us if you got a NEW MEDICATION prescribed prior to your procedure or have been admitted to the hospital within 30 days.    Please shower your procedure and do not use powders or lotions.    Please notify us if you have been admitted to the hospital within the past 30 days.    Bring a list of daily medications, vitamins, minerals, herbals and nutritional supplements you take. Include dosage and time you take them each day.    If packing an overnight bag, pack minimal clothing, you will be given hospital sleepwear. Do not bring money, valuables or jewelry. Wedding band is OK.    If you use CPAP machine, bring it to the hospital.      Have your Photo ID and Insurance cards with you.    DO NOT take any diabetic medication, including insulin, the morning of the procedure. Oral diabetic medications may include:  Glucophage, Prandin, Glyburide, Micronase, Avandia, Glocovance, Precose, Glynase y Starlix.    You should continue to take your morning dose of heart and/or blood pressure medications with a sip of water UNLESS ADVISED OTHERWISE.    If you develop a cold, sore throat, fever or any other illness prior to your procedure date, notify your surgeon immediately            Thank you,   Marisa Romero  Surgery Coordinator  [facility]   [address] [place] [address]  [place] [address]  Ph: [phone]

## 2025-01-24 NOTE — TELEPHONE ENCOUNTER
Patient scheduled for  LHC  at [facility] on 02/03/2025  with Dr Avendano.  Patient aware of general instructions, labs test required.   No Meds holds

## 2025-01-25 ENCOUNTER — APPOINTMENT (OUTPATIENT)
Dept: LAB | Facility: CLINIC | Age: 70
End: 2025-01-25
Payer: COMMERCIAL

## 2025-01-25 DIAGNOSIS — I25.10 CORONARY ARTERY DISEASE INVOLVING NATIVE CORONARY ARTERY OF NATIVE HEART WITHOUT ANGINA PECTORIS: ICD-10-CM

## 2025-01-25 DIAGNOSIS — I20.89 STABLE ANGINA (HCC): ICD-10-CM

## 2025-01-25 LAB
ALBUMIN SERPL BCG-MCNC: 4.7 G/DL (ref 3.5–5)
ALP SERPL-CCNC: 51 U/L (ref 34–104)
ALT SERPL W P-5'-P-CCNC: 27 U/L (ref 7–52)
ANION GAP SERPL CALCULATED.3IONS-SCNC: 7 MMOL/L (ref 4–13)
AST SERPL W P-5'-P-CCNC: 29 U/L (ref 13–39)
BASOPHILS # BLD AUTO: 0.04 THOUSANDS/ΜL (ref 0–0.1)
BASOPHILS NFR BLD AUTO: 1 % (ref 0–1)
BILIRUB SERPL-MCNC: 0.69 MG/DL (ref 0.2–1)
BUN SERPL-MCNC: 14 MG/DL (ref 5–25)
CALCIUM SERPL-MCNC: 10 MG/DL (ref 8.4–10.2)
CHLORIDE SERPL-SCNC: 106 MMOL/L (ref 96–108)
CO2 SERPL-SCNC: 29 MMOL/L (ref 21–32)
CREAT SERPL-MCNC: 0.87 MG/DL (ref 0.6–1.3)
EOSINOPHIL # BLD AUTO: 0.26 THOUSAND/ΜL (ref 0–0.61)
EOSINOPHIL NFR BLD AUTO: 3 % (ref 0–6)
ERYTHROCYTE [DISTWIDTH] IN BLOOD BY AUTOMATED COUNT: 13.6 % (ref 11.6–15.1)
GFR SERPL CREATININE-BSD FRML MDRD: 86 ML/MIN/1.73SQ M
GLUCOSE P FAST SERPL-MCNC: 102 MG/DL (ref 65–99)
HCT VFR BLD AUTO: 45.4 % (ref 36.5–49.3)
HGB BLD-MCNC: 14.6 G/DL (ref 12–17)
IMM GRANULOCYTES # BLD AUTO: 0.03 THOUSAND/UL (ref 0–0.2)
IMM GRANULOCYTES NFR BLD AUTO: 0 % (ref 0–2)
LYMPHOCYTES # BLD AUTO: 1.8 THOUSANDS/ΜL (ref 0.6–4.47)
LYMPHOCYTES NFR BLD AUTO: 24 % (ref 14–44)
MCH RBC QN AUTO: 29 PG (ref 26.8–34.3)
MCHC RBC AUTO-ENTMCNC: 32.2 G/DL (ref 31.4–37.4)
MCV RBC AUTO: 90 FL (ref 82–98)
MONOCYTES # BLD AUTO: 0.65 THOUSAND/ΜL (ref 0.17–1.22)
MONOCYTES NFR BLD AUTO: 9 % (ref 4–12)
NEUTROPHILS # BLD AUTO: 4.87 THOUSANDS/ΜL (ref 1.85–7.62)
NEUTS SEG NFR BLD AUTO: 63 % (ref 43–75)
NRBC BLD AUTO-RTO: 0 /100 WBCS
PLATELET # BLD AUTO: 245 THOUSANDS/UL (ref 149–390)
PMV BLD AUTO: 11.1 FL (ref 8.9–12.7)
POTASSIUM SERPL-SCNC: 4.6 MMOL/L (ref 3.5–5.3)
PROT SERPL-MCNC: 7.4 G/DL (ref 6.4–8.4)
RBC # BLD AUTO: 5.03 MILLION/UL (ref 3.88–5.62)
SODIUM SERPL-SCNC: 142 MMOL/L (ref 135–147)
WBC # BLD AUTO: 7.65 THOUSAND/UL (ref 4.31–10.16)

## 2025-01-25 PROCEDURE — 36415 COLL VENOUS BLD VENIPUNCTURE: CPT

## 2025-01-25 PROCEDURE — 85025 COMPLETE CBC W/AUTO DIFF WBC: CPT

## 2025-01-25 PROCEDURE — 80053 COMPREHEN METABOLIC PANEL: CPT

## 2025-01-31 ENCOUNTER — TELEPHONE (OUTPATIENT)
Age: 70
End: 2025-01-31

## 2025-01-31 NOTE — TELEPHONE ENCOUNTER
Pt called stating he received letter in mail today from [facility] informing him cardiac cath scheduled for Monday is not approved, additional clinical info needed. S/w Eli in cardiology surgery scheduling office, pt can disregard letter, they did obtain approval (approval located under referrals, dated 1/24/25). Pt informed of same.

## 2025-02-03 ENCOUNTER — HOSPITAL ENCOUNTER (OUTPATIENT)
Facility: HOSPITAL | Age: 70
Setting detail: OUTPATIENT SURGERY
Discharge: HOME | End: 2025-02-04
Attending: INTERNAL MEDICINE | Admitting: INTERNAL MEDICINE
Payer: COMMERCIAL

## 2025-02-03 DIAGNOSIS — Z95.5 STATUS POST INSERTION OF DRUG ELUTING CORONARY ARTERY STENT: Primary | ICD-10-CM

## 2025-02-03 DIAGNOSIS — I20.9 ANGINA PECTORIS (HCC): ICD-10-CM

## 2025-02-03 DIAGNOSIS — I25.10 CORONARY ARTERY DISEASE INVOLVING NATIVE CORONARY ARTERY OF NATIVE HEART WITHOUT ANGINA PECTORIS: ICD-10-CM

## 2025-02-03 LAB
ANION GAP SERPL CALCULATED.3IONS-SCNC: 4 MMOL/L (ref 4–13)
ATRIAL RATE: 51 BPM
ATRIAL RATE: 52 BPM
ATRIAL RATE: 53 BPM
ATRIAL RATE: 56 BPM
BUN SERPL-MCNC: 12 MG/DL (ref 5–25)
CALCIUM SERPL-MCNC: 8.9 MG/DL (ref 8.4–10.2)
CHLORIDE SERPL-SCNC: 108 MMOL/L (ref 96–108)
CO2 SERPL-SCNC: 27 MMOL/L (ref 21–32)
CREAT SERPL-MCNC: 0.85 MG/DL (ref 0.6–1.3)
GFR SERPL CREATININE-BSD FRML MDRD: 87 ML/MIN/1.73SQ M
GLUCOSE P FAST SERPL-MCNC: 102 MG/DL (ref 65–99)
GLUCOSE SERPL-MCNC: 102 MG/DL (ref 65–140)
KCT BLD-ACNC: 262 SEC (ref 89–137)
KCT BLD-ACNC: 281 SEC (ref 89–137)
KCT BLD-ACNC: 287 SEC (ref 89–137)
KCT BLD-ACNC: 712 SEC (ref 89–137)
P AXIS: 0 DEGREES
P AXIS: 49 DEGREES
P AXIS: 51 DEGREES
P AXIS: 59 DEGREES
POTASSIUM SERPL-SCNC: 4.4 MMOL/L (ref 3.5–5.3)
PR INTERVAL: 162 MS
PR INTERVAL: 174 MS
PR INTERVAL: 178 MS
PR INTERVAL: 182 MS
QRS AXIS: -30 DEGREES
QRS AXIS: -63 DEGREES
QRS AXIS: -77 DEGREES
QRS AXIS: -84 DEGREES
QRS AXIS: 258 DEGREES
QRSD INTERVAL: 120 MS
QRSD INTERVAL: 120 MS
QRSD INTERVAL: 124 MS
QRSD INTERVAL: 126 MS
QRSD INTERVAL: 128 MS
QT INTERVAL: 450 MS
QT INTERVAL: 454 MS
QT INTERVAL: 454 MS
QT INTERVAL: 470 MS
QT INTERVAL: 508 MS
QTC INTERVAL: 427 MS
QTC INTERVAL: 431 MS
QTC INTERVAL: 435 MS
QTC INTERVAL: 438 MS
QTC INTERVAL: 491 MS
SODIUM SERPL-SCNC: 139 MMOL/L (ref 135–147)
SPECIMEN SOURCE: ABNORMAL
T WAVE AXIS: -54 DEGREES
T WAVE AXIS: -85 DEGREES
T WAVE AXIS: 12 DEGREES
T WAVE AXIS: 32 DEGREES
T WAVE AXIS: 33 DEGREES
VENTRICULAR RATE: 52 BPM
VENTRICULAR RATE: 53 BPM
VENTRICULAR RATE: 54 BPM
VENTRICULAR RATE: 56 BPM
VENTRICULAR RATE: 56 BPM

## 2025-02-03 PROCEDURE — C1761: HCPCS | Performed by: INTERNAL MEDICINE

## 2025-02-03 PROCEDURE — C1753 CATH, INTRAVAS ULTRASOUND: HCPCS | Performed by: INTERNAL MEDICINE

## 2025-02-03 PROCEDURE — 85347 COAGULATION TIME ACTIVATED: CPT

## 2025-02-03 PROCEDURE — C1769 GUIDE WIRE: HCPCS | Performed by: INTERNAL MEDICINE

## 2025-02-03 PROCEDURE — 92928 PRQ TCAT PLMT NTRAC ST 1 LES: CPT | Performed by: INTERNAL MEDICINE

## 2025-02-03 PROCEDURE — 99152 MOD SED SAME PHYS/QHP 5/>YRS: CPT | Performed by: INTERNAL MEDICINE

## 2025-02-03 PROCEDURE — C9600 PERC DRUG-EL COR STENT SING: HCPCS | Performed by: INTERNAL MEDICINE

## 2025-02-03 PROCEDURE — 92978 ENDOLUMINL IVUS OCT C 1ST: CPT | Performed by: INTERNAL MEDICINE

## 2025-02-03 PROCEDURE — 80048 BASIC METABOLIC PNL TOTAL CA: CPT | Performed by: NURSE PRACTITIONER

## 2025-02-03 PROCEDURE — C1874 STENT, COATED/COV W/DEL SYS: HCPCS | Performed by: INTERNAL MEDICINE

## 2025-02-03 PROCEDURE — 93010 ELECTROCARDIOGRAM REPORT: CPT | Performed by: INTERNAL MEDICINE

## 2025-02-03 PROCEDURE — C1725 CATH, TRANSLUMIN NON-LASER: HCPCS | Performed by: INTERNAL MEDICINE

## 2025-02-03 PROCEDURE — 93005 ELECTROCARDIOGRAM TRACING: CPT

## 2025-02-03 PROCEDURE — C1887 CATHETER, GUIDING: HCPCS | Performed by: INTERNAL MEDICINE

## 2025-02-03 PROCEDURE — 92972 PERQ TRLUML CORONRY LITHOTRP: CPT | Performed by: INTERNAL MEDICINE

## 2025-02-03 PROCEDURE — C1894 INTRO/SHEATH, NON-LASER: HCPCS | Performed by: INTERNAL MEDICINE

## 2025-02-03 PROCEDURE — 93458 L HRT ARTERY/VENTRICLE ANGIO: CPT | Performed by: INTERNAL MEDICINE

## 2025-02-03 PROCEDURE — 99153 MOD SED SAME PHYS/QHP EA: CPT | Performed by: INTERNAL MEDICINE

## 2025-02-03 DEVICE — IMPLANTABLE DEVICE: Type: IMPLANTABLE DEVICE | Status: FUNCTIONAL

## 2025-02-03 DEVICE — IMPLANTABLE DEVICE
Type: IMPLANTABLE DEVICE | Status: FUNCTIONAL
Brand: ORSIRO MISSION

## 2025-02-03 RX ORDER — SODIUM CHLORIDE 9 MG/ML
100 INJECTION, SOLUTION INTRAVENOUS CONTINUOUS
Status: DISPENSED | OUTPATIENT
Start: 2025-02-03 | End: 2025-02-03

## 2025-02-03 RX ORDER — TAMSULOSIN HYDROCHLORIDE 0.4 MG/1
0.8 CAPSULE ORAL
Status: DISCONTINUED | OUTPATIENT
Start: 2025-02-04 | End: 2025-02-04 | Stop reason: HOSPADM

## 2025-02-03 RX ORDER — DULOXETIN HYDROCHLORIDE 20 MG/1
20 CAPSULE, DELAYED RELEASE ORAL ONCE
Status: COMPLETED | OUTPATIENT
Start: 2025-02-03 | End: 2025-02-03

## 2025-02-03 RX ORDER — ASPIRIN 81 MG/1
81 TABLET, CHEWABLE ORAL DAILY
Status: DISCONTINUED | OUTPATIENT
Start: 2025-02-04 | End: 2025-02-04 | Stop reason: HOSPADM

## 2025-02-03 RX ORDER — CALCIUM CARBONATE 500(1250)
1 TABLET ORAL
Status: DISCONTINUED | OUTPATIENT
Start: 2025-02-04 | End: 2025-02-04 | Stop reason: HOSPADM

## 2025-02-03 RX ORDER — SODIUM CHLORIDE 9 MG/ML
125 INJECTION, SOLUTION INTRAVENOUS CONTINUOUS
Status: DISCONTINUED | OUTPATIENT
Start: 2025-02-03 | End: 2025-02-03

## 2025-02-03 RX ORDER — LIDOCAINE HYDROCHLORIDE 10 MG/ML
INJECTION, SOLUTION EPIDURAL; INFILTRATION; INTRACAUDAL; PERINEURAL CODE/TRAUMA/SEDATION MEDICATION
Status: DISCONTINUED | OUTPATIENT
Start: 2025-02-03 | End: 2025-02-03 | Stop reason: HOSPADM

## 2025-02-03 RX ORDER — ISOSORBIDE MONONITRATE 30 MG/1
30 TABLET, EXTENDED RELEASE ORAL DAILY
Status: DISCONTINUED | OUTPATIENT
Start: 2025-02-04 | End: 2025-02-04 | Stop reason: HOSPADM

## 2025-02-03 RX ORDER — TAMSULOSIN HYDROCHLORIDE 0.4 MG/1
0.8 CAPSULE ORAL ONCE
Status: COMPLETED | OUTPATIENT
Start: 2025-02-03 | End: 2025-02-03

## 2025-02-03 RX ORDER — GABAPENTIN 300 MG/1
300 CAPSULE ORAL DAILY
Status: DISCONTINUED | OUTPATIENT
Start: 2025-02-04 | End: 2025-02-04 | Stop reason: HOSPADM

## 2025-02-03 RX ORDER — NITROGLYCERIN 20 MG/100ML
INJECTION INTRAVENOUS CODE/TRAUMA/SEDATION MEDICATION
Status: DISCONTINUED | OUTPATIENT
Start: 2025-02-03 | End: 2025-02-03 | Stop reason: HOSPADM

## 2025-02-03 RX ORDER — FENTANYL CITRATE 50 UG/ML
INJECTION, SOLUTION INTRAMUSCULAR; INTRAVENOUS CODE/TRAUMA/SEDATION MEDICATION
Status: DISCONTINUED | OUTPATIENT
Start: 2025-02-03 | End: 2025-02-03 | Stop reason: HOSPADM

## 2025-02-03 RX ORDER — ASPIRIN 81 MG/1
324 TABLET, CHEWABLE ORAL ONCE
Status: COMPLETED | OUTPATIENT
Start: 2025-02-03 | End: 2025-02-03

## 2025-02-03 RX ORDER — ATORVASTATIN CALCIUM 80 MG/1
80 TABLET, FILM COATED ORAL
Status: DISCONTINUED | OUTPATIENT
Start: 2025-02-04 | End: 2025-02-04 | Stop reason: HOSPADM

## 2025-02-03 RX ORDER — HYDROXYZINE HYDROCHLORIDE 10 MG/1
10 TABLET, FILM COATED ORAL 2 TIMES DAILY PRN
Status: DISCONTINUED | OUTPATIENT
Start: 2025-02-03 | End: 2025-02-04 | Stop reason: HOSPADM

## 2025-02-03 RX ORDER — DULOXETIN HYDROCHLORIDE 20 MG/1
20 CAPSULE, DELAYED RELEASE ORAL EVERY EVENING
Status: DISCONTINUED | OUTPATIENT
Start: 2025-02-04 | End: 2025-02-04 | Stop reason: HOSPADM

## 2025-02-03 RX ORDER — DULOXETIN HYDROCHLORIDE 60 MG/1
60 CAPSULE, DELAYED RELEASE ORAL DAILY
Status: DISCONTINUED | OUTPATIENT
Start: 2025-02-04 | End: 2025-02-04 | Stop reason: HOSPADM

## 2025-02-03 RX ORDER — MIDAZOLAM HYDROCHLORIDE 2 MG/2ML
INJECTION, SOLUTION INTRAMUSCULAR; INTRAVENOUS CODE/TRAUMA/SEDATION MEDICATION
Status: DISCONTINUED | OUTPATIENT
Start: 2025-02-03 | End: 2025-02-03 | Stop reason: HOSPADM

## 2025-02-03 RX ORDER — HEPARIN SODIUM 1000 [USP'U]/ML
INJECTION, SOLUTION INTRAVENOUS; SUBCUTANEOUS CODE/TRAUMA/SEDATION MEDICATION
Status: DISCONTINUED | OUTPATIENT
Start: 2025-02-03 | End: 2025-02-03 | Stop reason: HOSPADM

## 2025-02-03 RX ORDER — ISOSORBIDE MONONITRATE 30 MG/1
30 TABLET, EXTENDED RELEASE ORAL ONCE
Status: COMPLETED | OUTPATIENT
Start: 2025-02-03 | End: 2025-02-03

## 2025-02-03 RX ORDER — ATORVASTATIN CALCIUM 80 MG/1
80 TABLET, FILM COATED ORAL ONCE
Status: COMPLETED | OUTPATIENT
Start: 2025-02-03 | End: 2025-02-03

## 2025-02-03 RX ORDER — AMLODIPINE BESYLATE 5 MG/1
5 TABLET ORAL DAILY
Status: DISCONTINUED | OUTPATIENT
Start: 2025-02-04 | End: 2025-02-04 | Stop reason: HOSPADM

## 2025-02-03 RX ORDER — EZETIMIBE 10 MG/1
10 TABLET ORAL
Status: DISCONTINUED | OUTPATIENT
Start: 2025-02-03 | End: 2025-02-04 | Stop reason: HOSPADM

## 2025-02-03 RX ORDER — VERAPAMIL HYDROCHLORIDE 2.5 MG/ML
INJECTION, SOLUTION INTRAVENOUS CODE/TRAUMA/SEDATION MEDICATION
Status: DISCONTINUED | OUTPATIENT
Start: 2025-02-03 | End: 2025-02-03 | Stop reason: HOSPADM

## 2025-02-03 RX ORDER — METOPROLOL SUCCINATE 25 MG/1
25 TABLET, EXTENDED RELEASE ORAL DAILY
Status: DISCONTINUED | OUTPATIENT
Start: 2025-02-04 | End: 2025-02-04 | Stop reason: HOSPADM

## 2025-02-03 RX ORDER — NITROGLYCERIN 0.4 MG/1
0.4 TABLET SUBLINGUAL
Status: DISCONTINUED | OUTPATIENT
Start: 2025-02-03 | End: 2025-02-04 | Stop reason: HOSPADM

## 2025-02-03 RX ADMIN — ATORVASTATIN CALCIUM 80 MG: 80 TABLET, FILM COATED ORAL at 22:01

## 2025-02-03 RX ADMIN — EZETIMIBE 10 MG: 10 TABLET ORAL at 22:00

## 2025-02-03 RX ADMIN — SODIUM CHLORIDE 125 ML/HR: 0.9 INJECTION, SOLUTION INTRAVENOUS at 07:22

## 2025-02-03 RX ADMIN — ISOSORBIDE MONONITRATE 30 MG: 30 TABLET, EXTENDED RELEASE ORAL at 14:28

## 2025-02-03 RX ADMIN — TICAGRELOR 90 MG: 90 TABLET ORAL at 22:00

## 2025-02-03 RX ADMIN — DULOXETINE HYDROCHLORIDE 20 MG: 20 CAPSULE, DELAYED RELEASE PELLETS ORAL at 22:01

## 2025-02-03 RX ADMIN — TAMSULOSIN HYDROCHLORIDE 0.8 MG: 0.4 CAPSULE ORAL at 22:01

## 2025-02-03 RX ADMIN — ASPIRIN 81 MG CHEWABLE TABLET 324 MG: 81 TABLET CHEWABLE at 07:21

## 2025-02-03 NOTE — DISCHARGE SUMMARY
"Discharge Summary - Cardiology   Name: Carlo Samuel 69 y.o. male I MRN: [id number]  Unit/Bed#: [facility] RO[facility] I Date of Admission: 2/3/2025   Date of Service: 2/3/2025 I Hospital Day: 0    Discharge Summary - Carlo Samuel 69 y.o. male MRN: [id number]    Unit/Bed#: [facility] RO[facility] Encounter: [id number]    Admission Date: 2/3/2025   Discharge Date:  2/4/2025    Disposition: Home    Condition at Discharge: good     PCP: Henri Wise      OP Cardiologist: Dr Mallory     Interventional cardiologist: Dr Avendano     Admitting Diagnosis:  Angina, stable    Secondary Diagnoses:   Coronary artery disease  -Status post PCI to proximal RCA and D2 (6/2009)  -Home Rx: Aspirin/statin/nitrates/beta-blocker    Hyperlipidemia  -Home Rx rosuvastatin 40 mg daily  -Cholesterol 177, triglycerides 104, HDL 78 and LDL 74  -Zetia added during admission      Discharge Diagnosis: Coronary artery disease status post successful  IVUS guided  IVL PCI and placement of 2 SURI to proximal and mid LAD.            /55 (BP Location: Right arm)   Pulse 56   Temp 98.7 °F (37.1 °C) (Oral)   Resp 16   Ht 5' 8\" (1.727 m)   Wt 108 kg (237 lb)   SpO2 95%   BMI 36.04 kg/m²       Review of Systems   Constitutional: Negative.  Negative for chills, fatigue and fever.   HENT: Negative.  Negative for congestion.    Eyes: Negative.    Respiratory: Negative.  Negative for chest tightness, shortness of breath, wheezing and stridor.    Cardiovascular: Negative.  Negative for chest pain, palpitations and leg swelling.   Gastrointestinal:  Negative for abdominal pain.   Endocrine: Negative.    Genitourinary: Negative.  Negative for difficulty urinating.   Musculoskeletal: Negative.    Allergic/Immunologic: Negative.    Neurological: Negative.  Negative for dizziness, weakness and light-headedness.   Hematological: Negative.    Psychiatric/Behavioral: Negative.         Physical Exam  Constitutional:       General: He is not in acute " "Kacy Valdez is a 50 year old female who is being evaluated via a billable telephone visit.      The patient has been notified of following:     \"This telephone visit will be conducted via a call between you and your physician/provider. We have found that certain health care needs can be provided without the need for a physical exam.  This service lets us provide the care you need with a short phone conversation.  If a prescription is necessary we can send it directly to your pharmacy.  If lab work is needed we can place an order for that and you can then stop by our lab to have the test done at a later time.    Telephone visits are billed at different rates depending on your insurance coverage. During this emergency period, for some insurers they may be billed the same as an in-person visit.  Please reach out to your insurance provider with any questions.    If during the course of the call the physician/provider feels a telephone visit is not appropriate, you will not be charged for this service.\"    Patient has given verbal consent for Telephone visit?  Yes    How would you like to obtain your AVS? Krys    Subjective     Kacy Valdez is a 50 year old female who presents to clinic today for the following health issues:    Ear pain      Duration: 1 month    Description (location/character/radiation): right ear    Intensity:  moderate    Accompanying signs and symptoms: ear pain and feels plugged    History (similar episodes/previous evaluation): yes appt on 4/6    Precipitating or alleviating factors: None    Therapies tried and outcome: zpak and Augmentin     Last several notes reviewed   Zpack improved it some  Less pain but really plugged   50/50 plugged vs pain  Dull ache  Some muffle feeling / feels like in plane  On Claritin D  Sore throat totally gone              Reviewed and updated as needed this visit by Provider         Review of Systems   ROS COMP: CONSTITUTIONAL: NEGATIVE for fever, chills, change " distress.     Appearance: Normal appearance. He is obese. He is not ill-appearing.   HENT:      Head: Normocephalic.      Nose: Nose normal.      Mouth/Throat:      Mouth: Mucous membranes are moist.   Eyes:      Conjunctiva/sclera: Conjunctivae normal.   Cardiovascular:      Rate and Rhythm: Normal rate and regular rhythm.      Pulses: Normal pulses.      Heart sounds: Normal heart sounds. No murmur heard.     No friction rub. No gallop.   Pulmonary:      Effort: Pulmonary effort is normal.      Breath sounds: Normal breath sounds. No wheezing, rhonchi or rales.   Abdominal:      General: Bowel sounds are normal. There is no distension.      Palpations: Abdomen is soft.   Musculoskeletal:      Cervical back: Neck supple.      Right lower leg: No edema.      Left lower leg: No edema.   Skin:     General: Skin is warm.      Capillary Refill: Capillary refill takes less than 2 seconds.   Neurological:      Mental Status: He is alert and oriented to person, place, and time.   Psychiatric:         Mood and Affect: Mood normal.         Thought Content: Thought content normal.         Judgment: Judgment normal.             HPI and Hospital Course: Carlo Samuel, a 69-year-old male, presented to [facility] on 2/3/2025 for outpatient elective cardiac catheterization.  He continues to experience exertional chest pain and shortness of breath.  He underwent a cardiac catheterization (5/23/2024) which showed mid LAD lesion 65% stenosis.  Ostial circumflex lesion 40% stenosis and proximal RCA 10% stenosis.  He was started on goal-directed medical therapy and attended cardiac rehab.  Unfortunately, the exertional chest pain and shortness of breath continued and he was subsequently referred for cardiac catheterization.      Past medical history is significant for coronary artery disease status post stent to proximal RCA (2009) and hyperlipidemia.       Cardiac catheterization was performed via right radial artery.   in weight  ENT/MOUTH: NEGATIVE for ear, mouth and throat problems  RESP: NEGATIVE for significant cough or SOB       Objective   Reported vitals:  There were no vitals taken for this visit.   healthy, alert and no distress  PSYCH: Alert and oriented times 3; coherent speech, normal   rate and volume, able to articulate logical thoughts, able   to abstract reason, no tangential thoughts, no hallucinations   or delusions  Her affect is normal  RESP: No cough, no audible wheezing, able to talk in full sentences  Remainder of exam unable to be completed due to telephone visits    Diagnostic Test Results:  Labs reviewed in Epic        Assessment/Plan:    ICD-10-CM    1. Otalgia, right  H92.01    2. Dysfunction of right eustachian tube  H69.81    Discussed and gave verbal and written instruction on her AVS on how to use Afrin .  Continue Claritin D. Symptomatic treatment was discussed along when patient should call and/or come back into the clinic or go to ER/Urgent care. All questions answered.         No follow-ups on file.      Phone call duration:  7 minutes    Carlos Hart MD       During cardiac catheterization patient was loaded with aspirin 324 mg and Brilinta 180 mg.  Proximal to mid LAD lesion 75% stenosis, heavily calcified. Status post successful  IVUS guided  IVL PCI and placement of 2 SURI to proximal and mid LAD (Orsiro 3.0 X 30 mm and Orsiro 2.75 X 26 mm).    He tolerated the procedure well and remained hemodynamically stable.       Right radial site within normal limits.  Radial and ulnar pulses palpable.  No hematoma no bleeding.  Vital signs remained stable throughout admission.  Telemetry normal sinus rhythm without ectopy.  Discharge labs within expected values.  hgbA1c 5.9 (prediabetic).       Discharge plan  Dual antiplatelet therapy with aspirin 81 mg daily and Brilinta 90 mg twice daily given the diffuse nature of his disease.  Consider long-term therapy with P2Y12 inhibitor for single antiplatelet therapy  Aggressive risk factor modification  Increase Imdur to 60 mg daily  To cardiac rehab upon discharge for full 36 sessions  Referral to weight management-patient refused   Add Zetia 10 mg daily  Follow-up appointment with cardiology as scheduled for 2/18/2025 at 2:30 PM in the [facility] office  Continue to monitor prediabetes on the outpatient basis      Current Facility-Administered Medications:     [START ON 2/4/2025] amLODIPine (NORVASC) tablet 5 mg, Daily    [START ON 2/4/2025] aspirin chewable tablet 81 mg, Daily    [START ON 2/4/2025] atorvastatin (LIPITOR) tablet 80 mg, Daily    [START ON 2/4/2025] calcium carbonate (OYSTER SHELL,OSCAL) 500 mg tablet 1 tablet, Daily With Breakfast    [START ON 2/4/2025] DULoxetine (CYMBALTA) delayed release capsule 20 mg, QPM    [START ON 2/4/2025] DULoxetine (CYMBALTA) delayed release capsule 60 mg, Daily    fentaNYL injection, Code/Trauma/Sedation Med    [START ON 2/4/2025] gabapentin (NEURONTIN) capsule 300 mg, Daily    heparin (porcine) injection, Code/Trauma/Sedation Med    hydrOXYzine HCL (ATARAX) tablet 10 mg, BID PRN     "[START ON 2/4/2025] isosorbide mononitrate (IMDUR) 24 hr tablet 30 mg, Daily    lidocaine (PF) (XYLOCAINE-MPF) 1 % injection, Code/Trauma/Sedation Med    [START ON 2/4/2025] metoprolol succinate (TOPROL-XL) 24 hr tablet 25 mg, Daily    midazolam (VERSED) injection, Code/Trauma/Sedation Med    multivitamin stress formula tablet 1 tablet, Daily    nitroglycerin (NITROSTAT) SL tablet 0.4 mg, Q5 Min PRN    nitroGLYcerin 200 mcg/mL IA bolus, Code/Trauma/Sedation Med    sodium chloride 0.9 % infusion, Continuous    [START ON 2/4/2025] tamsulosin (FLOMAX) capsule 0.8 mg, Daily With Dinner    tiZANidine (ZANAFLEX) tablet 4 mg, Daily PRN    verapamil (ISOPTIN) injection, Code/Trauma/Sedation Med    Pertinent Labs/diagnostics:        Lab Ressults:  Recent Results (from the past 24 hours)   Basic metabolic panel    Collection Time: 02/03/25  7:22 AM   Result Value Ref Range    Sodium 139 135 - 147 mmol/L    Potassium 4.4 3.5 - 5.3 mmol/L    Chloride 108 96 - 108 mmol/L    CO2 27 21 - 32 mmol/L    ANION GAP 4 4 - 13 mmol/L    BUN 12 5 - 25 mg/dL    Creatinine 0.85 0.60 - 1.30 mg/dL    Glucose 102 65 - 140 mg/dL    Glucose, Fasting 102 (H) 65 - 99 mg/dL    Calcium 8.9 8.4 - 10.2 mg/dL    eGFR 87 ml/min/1.73sq m           Lipid Profile:   No results found for: \"CHOL\"  Lab Results   Component Value Date    HDL 78 04/27/2024    HDL 74 10/24/2023    HDL 68 02/13/2023     Lab Results   Component Value Date    LDLCALC 78 04/27/2024    LDLCALC 97 10/24/2023    LDLCALC 69 02/13/2023     Lab Results   Component Value Date    TRIG 104 04/27/2024    TRIG 132 10/24/2023    TRIG 232 (H) 02/13/2023         Cardiac testing:   No results found for this or any previous visit.    No results found for this or any previous visit.    No valid procedures specified.  No results found for this or any previous visit.      Tele: Sinus bradycardia to NSR, short 5 beat run of SVT       Discharge instructions/Information to patient and family:   See after " visit summary for information provided to patient and family.      Provisions for Follow-Up Care:  See after visit summary for information related to follow-up care and any pertinent home health orders.      Planned Readmission: No    Discharge Statement:  I spent 45 minutes minutes discharging the patient. This time was spent on the day of discharge. I had direct contact with the patient on the day of discharge. Additional documentation is required if more than 30 minutes were spent on discharge.         ** Please Note: Fluency Direct Dictation voice to text software may have been used in the creation of this document. **

## 2025-02-03 NOTE — DISCHARGE INSTR - AVS FIRST PAGE
1. Please see the post angioplasty discharge instructions.   No heavy lifting, greater than 10 lbs. or strenuous activity for 5 days.  Follow angioplasty discharge instructions.    2.Remove band aid tomorrow.  Shower and wash area- wrist gently with soap and water- beginning tomorrow. Rinse and pat dry.  Apply new water seal band aid.  Repeat this process for 5 days. No powders, creams lotions or antibiotic ointments  for 5 days.  No tub baths, hot tubs or swimming for 5 days.     3. Call [facility]'s Cardiology Office ([phone]) if you develop a fever, redness or drainage at your wrist access site.      4. No driving for 2 days    5. Do not stop aspirin or Brilinta (Ticagrelor) any reason without a cardiologist’s consent, or the stent could block up and cause a heart attack.    6. Stent card and book.

## 2025-02-03 NOTE — LETTER
[facility]  [address]  [place] [address]  Dept: [phone]    February 4, 2025     Patient: Carlo Samuel   YOB: 1955   Date of Visit: 2/3/2025       To Whom it May Concern:    Carlo Samuel is under my professional care. He was seen in the hospital from 2/3/2025 to 02/04/25. He may return to work on 2/8/2025 without limitations.    If you have any questions or concerns, please don't hesitate to call.         Sincerely,          JESSICA Vogel

## 2025-02-03 NOTE — PLAN OF CARE
Problem: PAIN - ADULT  Goal: Verbalizes/displays adequate comfort level or baseline comfort level  Description: Interventions:  - Encourage patient to monitor pain and request assistance  - Assess pain using appropriate pain scale  - Administer analgesics based on type and severity of pain and evaluate response  - Implement non-pharmacological measures as appropriate and evaluate response  - Consider cultural and social influences on pain and pain management  - Notify physician/advanced practitioner if interventions unsuccessful or patient reports new pain  Outcome: Progressing     Problem: INFECTION - ADULT  Goal: Absence or prevention of progression during hospitalization  Description: INTERVENTIONS:  - Assess and monitor for signs and symptoms of infection  - Monitor lab/diagnostic results  - Monitor all insertion sites, i.e. indwelling lines, tubes, and drains  - Monitor endotracheal if appropriate and nasal secretions for changes in amount and color  - [place] appropriate cooling/warming therapies per order  - Administer medications as ordered  - Instruct and encourage patient and family to use good hand hygiene technique  - Identify and instruct in appropriate isolation precautions for identified infection/condition  Outcome: Progressing  Goal: Absence of fever/infection during neutropenic period  Description: INTERVENTIONS:  - Monitor WBC    Outcome: Progressing     Problem: SAFETY ADULT  Goal: Patient will remain free of falls  Description: INTERVENTIONS:  - Educate patient/family on patient safety including physical limitations  - Instruct patient to call for assistance with activity   - Consult OT/PT to assist with strengthening/mobility   - Keep Call bell within reach  - Keep bed low and locked with side rails adjusted as appropriate  - Keep care items and personal belongings within reach  - Initiate and maintain comfort rounds  - Make Fall Risk Sign visible to staff  - Offer Toileting every  Hours,  in advance of need  - Initiate/Maintain alarm  - Obtain necessary fall risk management equipment:   - Apply yellow socks and bracelet for high fall risk patients  - Consider moving patient to room near nurses station  Outcome: Progressing  Goal: Maintain or return to baseline ADL function  Description: INTERVENTIONS:  -  Assess patient's ability to carry out ADLs; assess patient's baseline for ADL function and identify physical deficits which impact ability to perform ADLs (bathing, care of mouth/teeth, toileting, grooming, dressing, etc.)  - Assess/evaluate cause of self-care deficits   - Assess range of motion  - Assess patient's mobility; develop plan if impaired  - Assess patient's need for assistive devices and provide as appropriate  - Encourage maximum independence but intervene and supervise when necessary  - Involve family in performance of ADLs  - Assess for home care needs following discharge   - Consider OT consult to assist with ADL evaluation and planning for discharge  - Provide patient education as appropriate  Outcome: Progressing  Goal: Maintains/Returns to pre admission functional level  Description: INTERVENTIONS:  - Perform AM-PAC 6 Click Basic Mobility/ Daily Activity assessment daily.  - Set and communicate daily mobility goal to care team and patient/family/caregiver.   - Collaborate with rehabilitation services on mobility goals if consulted  - Perform Range of Motion  times a day.  - Reposition patient every  hours.  - Dangle patient  times a day  - Stand patient  times a day  - Ambulate patient  times a day  - Out of bed to chair  times a day   - Out of bed for meals  times a day  - Out of bed for toileting  - Record patient progress and toleration of activity level   Outcome: Progressing     Problem: SAFETY ADULT  Goal: Patient will remain free of falls  Description: INTERVENTIONS:  - Educate patient/family on patient safety including physical limitations  - Instruct patient to call for  assistance with activity   - Consult OT/PT to assist with strengthening/mobility   - Keep Call bell within reach  - Keep bed low and locked with side rails adjusted as appropriate  - Keep care items and personal belongings within reach  - Initiate and maintain comfort rounds  - Make Fall Risk Sign visible to staff  - Offer Toileting every  Hours, in advance of need  - Initiate/Maintain alarm  - Obtain necessary fall risk management equipment:  - Apply yellow socks and bracelet for high fall risk patients  - Consider moving patient to room near nurses station  Outcome: Progressing  Goal: Maintain or return to baseline ADL function  Description: INTERVENTIONS:  -  Assess patient's ability to carry out ADLs; assess patient's baseline for ADL function and identify physical deficits which impact ability to perform ADLs (bathing, care of mouth/teeth, toileting, grooming, dressing, etc.)  - Assess/evaluate cause of self-care deficits   - Assess range of motion  - Assess patient's mobility; develop plan if impaired  - Assess patient's need for assistive devices and provide as appropriate  - Encourage maximum independence but intervene and supervise when necessary  - Involve family in performance of ADLs  - Assess for home care needs following discharge   - Consider OT consult to assist with ADL evaluation and planning for discharge  - Provide patient education as appropriate  Outcome: Progressing  Goal: Maintains/Returns to pre admission functional level  Description: INTERVENTIONS:  - Perform AM-PAC 6 Click Basic Mobility/ Daily Activity assessment daily.  - Set and communicate daily mobility goal to care team and patient/family/caregiver.   - Collaborate with rehabilitation services on mobility goals if consulted  - Perform Range of Motion  times a day.  - Reposition patient every  hours.  - Dangle patient  times a day  - Stand patient  times a day  - Ambulate patient  times a day  - Out of bed to chair  times a day   -  Out of bed for meals  times a day  - Out of bed for toileting  - Record patient progress and toleration of activity level   Outcome: Progressing

## 2025-02-03 NOTE — INTERVAL H&P NOTE
Vitals:    02/03/25 0719   BP: 106/55   Pulse: 56   Resp: 16   Temp: 98.7 °F (37.1 °C)   SpO2: 95%     Lab Results   Component Value Date    WBC 7.65 01/25/2025    HGB 14.6 01/25/2025    HCT 45.4 01/25/2025    MCV 90 01/25/2025     01/25/2025     Lab Results   Component Value Date    SODIUM 142 01/25/2025    K 4.6 01/25/2025     01/25/2025    CO2 29 01/25/2025    BUN 14 01/25/2025    CREATININE 0.87 01/25/2025    CALCIUM 10.0 01/25/2025     Patient seen and examined at bedside.    Brief overview of procedure discussed with patient.  Indication, risk, benefits and alternatives discussed with patient who verbalized understanding.  All questions addressed fully.  Patient elected to proceed with planned procedure, consent completed.    Patient remains clinically stable.  Renal function, coagulation profile, and hemoglobin all within normal limits.  Patient has no planned upcoming surgical procedures at this time.    We will proceed with planned procedure.    Amanda Herbert

## 2025-02-04 VITALS
SYSTOLIC BLOOD PRESSURE: 118 MMHG | RESPIRATION RATE: 16 BRPM | HEIGHT: 68 IN | OXYGEN SATURATION: 94 % | BODY MASS INDEX: 35.42 KG/M2 | DIASTOLIC BLOOD PRESSURE: 69 MMHG | TEMPERATURE: 98.6 F | HEART RATE: 76 BPM | WEIGHT: 233.69 LBS

## 2025-02-04 PROBLEM — R73.03 PRE-DIABETES: Status: ACTIVE | Noted: 2025-02-04

## 2025-02-04 LAB
ANION GAP SERPL CALCULATED.3IONS-SCNC: 9 MMOL/L (ref 4–13)
BUN SERPL-MCNC: 11 MG/DL (ref 5–25)
CALCIUM SERPL-MCNC: 9.4 MG/DL (ref 8.4–10.2)
CHLORIDE SERPL-SCNC: 107 MMOL/L (ref 96–108)
CO2 SERPL-SCNC: 24 MMOL/L (ref 21–32)
CREAT SERPL-MCNC: 0.73 MG/DL (ref 0.6–1.3)
ERYTHROCYTE [DISTWIDTH] IN BLOOD BY AUTOMATED COUNT: 13.9 % (ref 11.6–15.1)
EST. AVERAGE GLUCOSE BLD GHB EST-MCNC: 123 MG/DL
GFR SERPL CREATININE-BSD FRML MDRD: 93 ML/MIN/1.73SQ M
GLUCOSE P FAST SERPL-MCNC: 103 MG/DL (ref 65–99)
GLUCOSE SERPL-MCNC: 103 MG/DL (ref 65–140)
HBA1C MFR BLD: 5.9 %
HCT VFR BLD AUTO: 39.3 % (ref 36.5–49.3)
HGB BLD-MCNC: 13 G/DL (ref 12–17)
MCH RBC QN AUTO: 29.1 PG (ref 26.8–34.3)
MCHC RBC AUTO-ENTMCNC: 33.1 G/DL (ref 31.4–37.4)
MCV RBC AUTO: 88 FL (ref 82–98)
PLATELET # BLD AUTO: 192 THOUSANDS/UL (ref 149–390)
PMV BLD AUTO: 10.5 FL (ref 8.9–12.7)
POTASSIUM SERPL-SCNC: 3.7 MMOL/L (ref 3.5–5.3)
RBC # BLD AUTO: 4.46 MILLION/UL (ref 3.88–5.62)
SODIUM SERPL-SCNC: 140 MMOL/L (ref 135–147)
WBC # BLD AUTO: 8.03 THOUSAND/UL (ref 4.31–10.16)

## 2025-02-04 PROCEDURE — 85027 COMPLETE CBC AUTOMATED: CPT

## 2025-02-04 PROCEDURE — 83036 HEMOGLOBIN GLYCOSYLATED A1C: CPT

## 2025-02-04 PROCEDURE — 80048 BASIC METABOLIC PNL TOTAL CA: CPT

## 2025-02-04 PROCEDURE — NC001 PR NO CHARGE

## 2025-02-04 RX ORDER — EZETIMIBE 10 MG/1
10 TABLET ORAL DAILY
Qty: 90 TABLET | Refills: 3 | Status: SHIPPED | OUTPATIENT
Start: 2025-02-04

## 2025-02-04 RX ADMIN — B-COMPLEX W/ C & FOLIC ACID TAB 1 TABLET: TAB at 08:28

## 2025-02-04 RX ADMIN — ISOSORBIDE MONONITRATE 30 MG: 30 TABLET, EXTENDED RELEASE ORAL at 08:27

## 2025-02-04 RX ADMIN — ASPIRIN 81 MG CHEWABLE TABLET 81 MG: 81 TABLET CHEWABLE at 08:27

## 2025-02-04 RX ADMIN — METOPROLOL SUCCINATE 25 MG: 25 TABLET, EXTENDED RELEASE ORAL at 08:28

## 2025-02-04 RX ADMIN — TICAGRELOR 90 MG: 90 TABLET ORAL at 08:28

## 2025-02-04 RX ADMIN — Medication 1 TABLET: at 08:27

## 2025-02-04 RX ADMIN — AMLODIPINE BESYLATE 5 MG: 5 TABLET ORAL at 08:27

## 2025-02-04 RX ADMIN — GABAPENTIN 300 MG: 300 CAPSULE ORAL at 08:28

## 2025-02-04 RX ADMIN — DULOXETINE 60 MG: 60 CAPSULE, DELAYED RELEASE ORAL at 08:27

## 2025-02-10 ENCOUNTER — TELEPHONE (OUTPATIENT)
Age: 70
End: 2025-02-10

## 2025-02-10 NOTE — TELEPHONE ENCOUNTER
Patient called requesting refill for isosorbide mononitrate (IMDUR) 30 mg 24 hr tablet . Patient made aware medication was refilled on 07/29/24 for 90 with 3 refills to [facility] pharmacy. Patient instructed to contact the pharmacy to obtain refills of medication. Patient verbalized understanding.

## 2025-02-18 ENCOUNTER — OFFICE VISIT (OUTPATIENT)
Dept: CARDIOLOGY CLINIC | Facility: CLINIC | Age: 70
End: 2025-02-18
Payer: COMMERCIAL

## 2025-02-18 VITALS
SYSTOLIC BLOOD PRESSURE: 108 MMHG | BODY MASS INDEX: 36.73 KG/M2 | DIASTOLIC BLOOD PRESSURE: 62 MMHG | WEIGHT: 234 LBS | HEIGHT: 67 IN | HEART RATE: 66 BPM | OXYGEN SATURATION: 96 % | RESPIRATION RATE: 16 BRPM

## 2025-02-18 DIAGNOSIS — I25.10 CORONARY ARTERY DISEASE INVOLVING NATIVE CORONARY ARTERY OF NATIVE HEART WITHOUT ANGINA PECTORIS: Primary | ICD-10-CM

## 2025-02-18 DIAGNOSIS — E78.2 MIXED HYPERLIPIDEMIA: ICD-10-CM

## 2025-02-18 PROCEDURE — 99214 OFFICE O/P EST MOD 30 MIN: CPT | Performed by: NURSE PRACTITIONER

## 2025-02-18 NOTE — ASSESSMENT & PLAN NOTE
S/p SUIR to pRCA and D2 6/2009 patent on recent cath  S/p SURI x 2 to proximal to mid LAD lesion, 2/3/2025  Continue asa, Brilinta, statin, nitrates, BB, CCB

## 2025-02-18 NOTE — PROGRESS NOTES
Patient ID: Carlo Samuel is a 70 y.o. male.        Plan:      Assessment & Plan  Coronary artery disease involving native coronary artery of native heart without angina pectoris  S/p SURI to pRCA and D2 6/2009 patent on recent cath  S/p SURI x 2 to proximal to mid LAD lesion, 2/3/2025  Continue asa, Brilinta, statin, nitrates, BB, CCB    Mixed hyperlipidemia  Continue rosuvastatin and Zetia      Follow up Plan/Summary Comments:  Carlo is doing well from a cardiac perspective.  His biggest concern is the lightheadedness when he stands up too quickly.  I note blood pressures are little soft.  Will cut back on amlodipine to 2.5 mg daily.    He is planning to start cardiac rehab which I encouraged him to do.    Continue DAPT for 1 full year and aggressive risk factor modification.    Office follow-up in 10 weeks.  He will call sooner if needed    HPI: I had the pleasure of seeing Carlo in the office today for a hospital follow-up visit.    Because of persistent angina despite increasing medical therapy, he underwent cardiac catheterization with revascularization of a proximal to mid LAD lesion.    Since his procedure, Carlo has been doing well.  He denies any recurrent symptoms of chest and throat discomfort.  He does have mild exertional dyspnea but attributes this to a cold he had back around [place] and inactivity.  Overall his symptoms are improving.    He does occasionally experience dizzy spells if he turns his head too quickly.  He also notes some lightheaded feelings if he stands up too quickly.      Review of Systems   10  point ROS  was otherwise non pertinent or negative except as per HPI or as below.   Gait: Normal      Most recent or relevant cardiac/vascular testing:    Cardiac Cath 2/3/2025  S/p IVUS guided PCI with SURI x 2 to the Prox-Mid LAD lesion    Cardiac catheterization 5/23/2024   Mid LAD with a hemodynamically significant, calcified lesion warranting GDMT initiation and PCI with likely  "IVL if refractory CP    Prox RCA with patent prior 2009 Stent and only mild ISR    LVEDP is normal without gradient with minimal gradient on LV-AO pullback     Echo 5/23/2024  EF 49%, moderate diastolic dysfunction  Mild LAE  Mildly dilated aortic root, 4.1 cm       Objective:     /62 (BP Location: Left arm, Patient Position: Sitting, Cuff Size: Large)   Pulse 66   Resp 16   Ht 5' 7\" (1.702 m)   Wt 106 kg (234 lb)   SpO2 96%   BMI 36.65 kg/m²     PHYSICAL EXAM:  110/58, 120/60  General:  Normal appearance, no acute distress  Eyes:  Anicteric.  Oral mucosa:  Moist.  Neck:  No JVD. Carotid upstrokes are brisk without bruits.  No masses.  Chest:  Clear to auscultation   Cardiac:  No palpable PMI.  Normal S1 and S2.  No murmur gallop or rub.  Abdomen:  Soft and nontender. No palpable organomegaly or aortic enlargement.  Extremities:  No peripheral edema.  Musculoskeletal:  Symmetric.   Vascular:  Femoral pulses are brisk without bruits.  Popliteal pulses are intact bilaterally.   Pedal pulses are intact.  Neuro:  Grossly symmetric.  Psych:  Alert and oriented x3.    No Known Allergies    Current Outpatient Medications:     amLODIPine (NORVASC) 5 mg tablet, Take 1 tablet (5 mg total) by mouth daily, Disp: 90 tablet, Rfl: 3    aspirin 81 MG tablet, Take 81 mg by mouth daily , Disp: , Rfl:     Calcium-Magnesium-Vitamin D (CALCIUM MAGNESIUM PO), Take by mouth, Disp: , Rfl:     DULoxetine (CYMBALTA) 20 mg capsule, Take 20 mg by mouth every evening, Disp: , Rfl:     DULoxetine (CYMBALTA) 60 mg delayed release capsule, Take 60 mg by mouth daily, Disp: , Rfl:     ezetimibe (ZETIA) 10 mg tablet, Take 1 tablet (10 mg total) by mouth daily, Disp: 90 tablet, Rfl: 3    gabapentin (NEURONTIN) 300 mg capsule, Take 300 mg by mouth daily, Disp: , Rfl:     hydrOXYzine HCL (ATARAX) 10 mg tablet, Take 10 mg by mouth 2 (two) times a day as needed for itching, allergies or anxiety, Disp: , Rfl:     isosorbide mononitrate (IMDUR) " 30 mg 24 hr tablet, Take 1 tablet (30 mg total) by mouth daily, Disp: 90 tablet, Rfl: 3    metoprolol succinate (TOPROL-XL) 25 mg 24 hr tablet, Take 1 tablet (25 mg total) by mouth daily, Disp: 90 tablet, Rfl: 3    Multiple Vitamin (MULTIVITAMIN) tablet, Take 1 tablet by mouth daily, Disp: , Rfl:     nitroglycerin (NITROSTAT) 0.4 mg SL tablet, Place 1 tablet (0.4 mg total) under the tongue every 5 (five) minutes as needed for chest pain, Disp: 25 tablet, Rfl: 3    rosuvastatin (CRESTOR) 40 MG tablet, Take 1 tablet (40 mg total) by mouth daily, Disp: 90 tablet, Rfl: 3    tamsulosin (FLOMAX) 0.4 mg, Take 0.8 mg by mouth daily with dinner, Disp: , Rfl:     ticagrelor (BRILINTA) 90 MG, Take 1 tablet (90 mg total) by mouth every 12 (twelve) hours, Disp: 60 tablet, Rfl: 11    tiZANidine (ZANAFLEX) 4 mg tablet, Take 4 mg by mouth as needed for muscle spasms, Disp: , Rfl:   Past Medical History:   Diagnosis Date    Coronary artery disease     s/p stenting; s/p SURI prox LAD-mid LAD 2/3/2025    History of epidural anesthesia     back L3/L4    Hyperlipidemia     Hypertension      Past Surgical History:   Procedure Laterality Date    ANTERIOR FUSION LUMBAR SPINE      BACK SURGERY      CARDIAC CATHETERIZATION Left 5/23/2024    Procedure: Cardiac catheterization;  Surgeon: Jenifer Avendano DO;  Location: [facility];  Service: Cardiology    CARDIAC CATHETERIZATION N/A 5/23/2024    Procedure: Cardiac Coronary Angiogram;  Surgeon: Jenifer Avendano DO;  Location: [facility];  Service: Cardiology    CARDIAC CATHETERIZATION N/A 5/23/2024    Procedure: Cardiac FFR/IFR;  Surgeon: Jenifer Avendano DO;  Location: [facility];  Service: Cardiology    CARDIAC CATHETERIZATION N/A 2/3/2025    Procedure: Cardiac catheterization;  Surgeon: Jenifer Avendano DO;  Location: [facility];  Service: Cardiology    CARDIAC CATHETERIZATION N/A 2/3/2025    Procedure: Cardiac Coronary Angiogram;  Surgeon: Jenifer  DARI Avendano DO;  Location: [facility];  Service: Cardiology    CARDIAC CATHETERIZATION N/A 2/3/2025    Procedure: Cardiac PCI;  Surgeon: Jenifer Avendano DO;  Location: [facility];  Service: Cardiology    CARPAL TUNNEL RELEASE      CORONARY ANGIOPLASTY WITH STENT PLACEMENT  2009    SURI to 95% prox RCA and 99% D2.    INGUINAL HERNIA REPAIR      NASAL TURBINATE REDUCTION Bilateral 10/14/2019    with outfracture - office procedure - Dr. Chapman    REPLACEMENT TOTAL KNEE      SHOULDER ARTHROPLASTY         CMP:   Lab Results   Component Value Date    K 3.7 2025     2025    CO2 24 2025    BUN 11 2025    CREATININE 0.73 2025    EGFR 93 2025     Lipid Profile:    Lab Results   Component Value Date    TRIG 104 2024    HDL 78 2024         Social History     Tobacco Use   Smoking Status Former    Current packs/day: 0.00    Types: Cigarettes    Quit date:     Years since quittin.1   Smokeless Tobacco Never

## 2025-02-24 NOTE — PROGRESS NOTES
CARDIAC REHABILITATION   ASSESSMENT AND INDIVIDUALIZED TREATMENT PLAN  INITIAL           Today's date: 2025   # of Exercise Sessions Completed: Initial Visit  Patient name: Carlo Samuel      : 1955  Age: 70 y.o.       MRN: [id number]  Referring Physician: Dr. Jenifer Avendano DO  Cardiologist: Dr. Chuy Rajput MD  Provider: [facility]  Clinician: Mahogany Wallace, MPT, CCRP        Treatment is tailored to this patient's individual needs.  The ITP was reviewed with the patient and all questions were answered to their satisfaction.  Additional ITP documentation can be found electronically including daily and monthly exercise summaries, daily session notes with ECG summaries, education notes, daily medication reconciliation, and daily physician supervision.      INITIAL EVALUATION SUMMARY 2025    Patient's subjective report of progress/symptoms: Patient states he still has some SOB w/exertion but no longer has the sensation that someone is squeezing his neck/choking him.   Home exercise/ADLs: Currently able to perform light ADLs only. He has not resumed riding his bike or returned to his local fitness center at this time.     Initial Fitness Assessment: 6 Minute Walk Test  Resting Vitals:HR 67, /60, 02 Sat 95%  Peak Vitals:HR 98, /70, 02 Sat 95%  Achieved 530 feet, 1.77 MET Level, RPE 5/10, KUNZ 3/10  Telemetry -NSR  Recovery Vitals: HR 68, /68, 02 sat 97%        Dx: S/P PCI w/SURI to prox and mid LAD 2/3/2025    Description of Diagnosis: Cardiac Cath performed 2/3/2025 due to persistent angina despite increased medical therapy. S/p IVUS guided PCI with SURI x 2 to the Prox-Mid LAD lesion. Patient doing well, denies any recurrent symptoms.     Cardiac catheterization 2024   Mid LAD with a hemodynamically significant, calcified lesion warranting GDMT initiation and PCI with likely IVL if refractory CP    Prox RCA with patent prior  Stent and only mild ISR     LVEDP is normal without gradient with minimal gradient on LV-AO pullback     Echo 5/23/2024  EF 49%, moderate diastolic dysfunction    Date of onset: 2/3/2025  Other Cardiac History: CAD, Stable Angina, HTN, HLD, Hx Smoking, Hx SURI 2009 and  Hx Cardiac Cath 2024        ASSESSMENT    Medical History:   Past Medical History:   Diagnosis Date    Coronary artery disease     s/p stenting; s/p SURI prox LAD-mid LAD 2/3/2025    History of epidural anesthesia     back L3/L4    Hyperlipidemia     Hypertension        Family History:  Family History   Problem Relation Age of Onset    Rheumatic fever Father     Heart disease Father     Cancer Father     Lung cancer Father        Allergies:   Patient has no known allergies.    Current Medications:   Current Outpatient Medications   Medication Sig Dispense Refill    amLODIPine (NORVASC) 5 mg tablet Take 1 tablet (5 mg total) by mouth daily 90 tablet 3    aspirin 81 MG tablet Take 81 mg by mouth daily       Calcium-Magnesium-Vitamin D (CALCIUM MAGNESIUM PO) Take by mouth      DULoxetine (CYMBALTA) 20 mg capsule Take 20 mg by mouth every evening      DULoxetine (CYMBALTA) 60 mg delayed release capsule Take 60 mg by mouth daily      ezetimibe (ZETIA) 10 mg tablet Take 1 tablet (10 mg total) by mouth daily 90 tablet 3    gabapentin (NEURONTIN) 300 mg capsule Take 300 mg by mouth daily      hydrOXYzine HCL (ATARAX) 10 mg tablet Take 10 mg by mouth 2 (two) times a day as needed for itching, allergies or anxiety      isosorbide mononitrate (IMDUR) 30 mg 24 hr tablet Take 1 tablet (30 mg total) by mouth daily 90 tablet 3    metoprolol succinate (TOPROL-XL) 25 mg 24 hr tablet Take 1 tablet (25 mg total) by mouth daily 90 tablet 3    Multiple Vitamin (MULTIVITAMIN) tablet Take 1 tablet by mouth daily      nitroglycerin (NITROSTAT) 0.4 mg SL tablet Place 1 tablet (0.4 mg total) under the tongue every 5 (five) minutes as needed for chest pain 25 tablet 3    rosuvastatin (CRESTOR) 40 MG  tablet Take 1 tablet (40 mg total) by mouth daily 90 tablet 3    tamsulosin (FLOMAX) 0.4 mg Take 0.8 mg by mouth daily with dinner      ticagrelor (BRILINTA) 90 MG Take 1 tablet (90 mg total) by mouth every 12 (twelve) hours 60 tablet 11    tiZANidine (ZANAFLEX) 4 mg tablet Take 4 mg by mouth as needed for muscle spasms       No current facility-administered medications for this visit.       Medication compliance: Yes   Comments: Pt reports to be compliant with medications.    Physical Limitations: Weakness, gait dysfunction and history lumbar spine fusion. Patient also has history of TKR and shoulder arthroplasty.     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   Other Comments:       SMART Exercise Goals:   10% improvement in functional capacity based on max METs achieved in initial fitness assessment  reduced dyspnea with physical activity    improved DASI score by 10%  increased exercise capacity by 40% based on peak METs tolerated in cardiac rehab exercise session  improved 6MWT distance by 10%    Patient Specific EXERCISE GOALS:       Ability  "to ambulate long distances w/out SOB  Tolerate moderate ADLs w/out symptoms  Return to work full time/duty    Functional Capacity Screening Tool:  [facility] Activity Status Index:  4.64 METs    NUTRITION ASSESSMENT:    Initial Weight:  234  Current Weight: 234    Height:   Ht Readings from Last 1 Encounters:   02/18/25 5' 7\" (1.702 m)       Rate Your Plate Score: 60/81    Diabetes: N/A  A1c: 5.9    last measured: 2/4/2025    Lipid management: Discussed diet and lipid management and Last lipid profile 4/27/2024  Chol 177    HDL 78  LDL 78    Current Dietary Habits:  Follows a low sodium diet and hydrates regularly throughout the day. He been limiting sugar and saturated fats.     SMART Nutrition Goals:   eat whole grain breads, brown rice and whole grain cereals, eat 5 or more servings of fruits and vegetables a day, eat red meat once a week or less, choose lean beef or rarely eat beef, eat chicken and fish that is not fried, never add salt to food when cooking or at the table, choose low sodium canned, frozen/packaged foods or rarely/never eat, and rarely/never eat salty snacks    Patient Specific NUTRITION GOALS:     1. Weight loss, does not have a goal weight at this time   2. Reduce red meat intake   3. Increase intake of whole grains    Drug/Alcohol Use:   No      PSYCHOSOCIAL ASSESSMENT:    Date of last Assessment:  2/25/2025  Depression screening:  PHQ-9 = 9    Interpretation:  5-9 = Mild Depression  Anxiety screening:  MARINO-7 = 8  Interpretation: 5-9 = Mild anxiety    Pt self-report of depression and anxiety   Patient reports they are coping well with good social support and denies depression or anxiety  compliant with medical therapy for depression/anxiety  Currently takes Cymbalta    Self-reported stress level:  7/10   Stressors:  Health, finances, employment and recent loss of his MIL  Stress Management Tools: read, exercise, spend time outside, and enjoy a hobby    SMART Psychosocial Goals:     Physical " "Fitness in [facility] Score < 3, Daily Activity in [facility] Score < 3, Quality of Life in [facility] Score < 3 , improved sleeping habits, and feel less tired with more energy    Patient Specific PSYCHOCOSOCIAL GOALS:    Reduce stress level to < 5/10  Improve sleep  Compliance w/current medication    Quality of Life Screen:  (Higher score indicates disease impact on QOL)  [facility] COOP score: 30/45     Social Support:   spouse  Community/Social Activities: Activities w/wife, especially outdoors. He states he can't wait for the weather to improve so he can spend more time outdoors.      Psychosocial Assessment as it relates to rehabilitation:   Patient denies issues with his family or home life that may affect their rehabilitation efforts.       OTHER CORE COMPONENT ASSESSMENT:    Tobacco Use:     Pt quit 2003   and has abstained    Anginal Symptoms:   \"It felt like someone was squeezing my neck, choking me\"   NTG use: Compliant with carrying NTG, Understands proper use, and Pt has not used NTG since event    SMART Goals:   consistent, controlled resting BP < 130/80, medication compliance, and reduced angina    Patient Specific CORE COMPONENT GOALS:    Reduce reliance on medication  Monitor vitals at home  Remain smoke free and continue to avoid second hand smoke/other respiratory irritants    INDIVIDUALIZED TREATMENT PLAN      EXERCISE GOALS and PLAN      Progress toward Exercise goals:   Reviewed Pt goals and determined plan of care, Will continue to educate and progress as tolerated.    Exercise Plan:    education on home exercise guidelines, home exercise 30+ mins 2 days opposite CR, and Group class: Risk Factors for Heart Disease    The patient was counseled on exercise guidelines to achieve a minimum of 150 mins/wk of moderate intensity (RPE 4-6) exercise and encouraged to add 1-2 days of exercise on opposite days of cardiac rehab as tolerated.       PHYSICIAN PRESCRIBED EXERCISE:    Current Aerobic Exercise " Prescription:      Frequency: 3 days/week   Supplement with home exercise 2+ days/wk as tolerated       Minutes: 30 - 40         METS: 2-3.50           HR: RHR +30-40bpm   RPE: 4-6/10         Modalities: Treadmill, UBE, NuStep, and Recumbent bike     Exercise workloads will be progressed gradually as tolerated, within limits of patient's ability, and according to the patient's response to the exercise program.      Aerobic Exercise Prescription Plan for Progression   Frequency: 3 days/week of cardiac rehab       Supplement with home exercise 2+ days/wk as tolerated    Minutes: 40 -45      >150 mins/wk of moderate intensity exercise   METS: 3.50-4.25   HR: RHR +30-40bpm     RPE: 4-6/10   Modalities: Treadmill, UBE, NuStep, and Recumbent bike    Strength trainin-3 days / week  12-15 repetitions  1-2 sets per modality    Modalities: Leg Press, Chest Press, Pull Downs, and UE Free Weights    Home Exercise:Plans to resume bike riding and exercise at his local fitness center when feeling a little better.      Exercise Education: benefit of exercise for CAD risk factors, home exercise guidelines, and RPE scale     Readiness to change: Preparation:  (Getting ready to change)       NUTRITION GOALS AND PLAN      Nutritional   Reviewed patient's Rate your Plate. Discussed key elements of heart healthy eating. Reviewed patient goals for dietary modifications and their clinical implications.  Reviewed most recent lipid profile.     Patient's progress toward Nutrition goals:    Reviewed Pt goals and determined plan of care, Will continue to educate and progress as tolerated.      Nutrition Plan:   group class: Reading Food Labels, increase intake of whole grains, replace refined flours with whole grains, increase daily intake of fruits and vegetables, choose lean red meat, cook without fats or oils, never/rarely eat fried foods, choose low sodium canned, frozen, packaged foods or rarely eat these foods, rarely/never eat  salty snacks, and choose low sugar desserts and sweets    Measurable goals were based Rate Your Plate Dietary Self-Assessment. These are the areas in which the patient could score higher on the assessment.  Goals include recommendations for a heart healthy diet based on American Heart Association.    Nutrition Education:   heart healthy eating principles  low sodium diet  maintaining hydration    Readiness to change: Preparation:  (Getting ready to change)       PSYCHOSOCIAL GOALS AND PLAN    Psychosocial Assessment as it relates to rehabilitation:   Patient denies issues with his/her family or home life that may affect their rehabilitation efforts.     Patient's progress toward Psychosocial goals:    Reviewed Pt goals and determined plan of care, Will continue to educate and progress as tolerated.    Psychosocial Intervention/plan:   Class: Stress and Your Health, Practice relaxation techniques, Exercise, and Spend time outdoors    Psychosocial Education: benefits of a positive support system and class:  Stress and Your Health     Information to utilize [employer] was provided as well as contact information for counseling through  Behavioral Health and group psychotherapy groups available.    Readiness to change: Preparation:  (Getting ready to change)       OTHER CORE COMPONENTS GOALS and PLAN      Blood Pressure will be monitored throughout the program and cardiologist will be notified of elevated trends.    Pt will be encouraged to monitor home BP if advised by cardiologist.    Tobacco Plan:   Pt quit 2003 and has abstained since quitting.      Progress toward Core Component goals:   Reviewed Pt goals and determined plan of care, Will continue to educate and progress as tolerated.    Other Core Components Intervention:   group class: Understanding Heart Disease, medication compliance, avoid places with second hand smoke, avoid processed foods, and engage in regular exercise    Group and Individual Education:   components of blood pressure management, low sodium diet and heart failure, and proper use of sublingual NTG    Readiness to change: Preparation:  (Getting ready to change)

## 2025-02-25 ENCOUNTER — CLINICAL SUPPORT (OUTPATIENT)
Dept: CARDIAC REHAB | Facility: HOSPITAL | Age: 70
End: 2025-02-25
Payer: COMMERCIAL

## 2025-02-25 DIAGNOSIS — Z95.5 STATUS POST INSERTION OF DRUG ELUTING CORONARY ARTERY STENT: Primary | ICD-10-CM

## 2025-02-25 PROCEDURE — 93797 PHYS/QHP OP CAR RHAB WO ECG: CPT

## 2025-03-10 DIAGNOSIS — I25.10 CORONARY ARTERY DISEASE INVOLVING NATIVE CORONARY ARTERY OF NATIVE HEART WITHOUT ANGINA PECTORIS: ICD-10-CM

## 2025-03-10 NOTE — TELEPHONE ENCOUNTER
Medication: Metoprolol succinate 25 mg     Dose/Frequency: 1 tablet daily     Quantity: 90    Pharmacy: [facility], [address] , [facility]     Office:   [] PCP/Provider -   [x] Speciality/Provider -     Does the patient have enough for 3 days?   [x] Yes   [] No - Send as HP to POD

## 2025-03-12 RX ORDER — METOPROLOL SUCCINATE 25 MG/1
25 TABLET, EXTENDED RELEASE ORAL DAILY
Qty: 90 TABLET | Refills: 3 | Status: SHIPPED | OUTPATIENT
Start: 2025-03-12

## 2025-03-17 DIAGNOSIS — Z95.5 STATUS POST INSERTION OF DRUG ELUTING CORONARY ARTERY STENT: ICD-10-CM

## 2025-03-17 NOTE — TELEPHONE ENCOUNTER
Reason for call:   [x] Refill   [] Prior Auth  [x] Other: Not a dup - pharmacy change and needs 90 days     Office:   [] PCP/Provider -   [x] Specialty/Provider - [facility]     Medication:     ticagrelor (BRILINTA) 90 MG  Take 1 tablet (90 mg total) by mouth every 12 (twelve) hours          Pharmacy: [facility]     Local Pharmacy   Does the patient have enough for 3 days?   [] Yes   [] No - Send as HP to POD    Mail Away Pharmacy   Does the patient have enough for 10 days?   [] Yes   [x] No - Send as HP to POD

## 2025-03-19 ENCOUNTER — TELEPHONE (OUTPATIENT)
Dept: PULMONOLOGY | Facility: HOSPITAL | Age: 70
End: 2025-03-19

## 2025-03-19 ENCOUNTER — TELEPHONE (OUTPATIENT)
Age: 70
End: 2025-03-19

## 2025-03-19 NOTE — TELEPHONE ENCOUNTER
Spoke with Mahogany at Cardiac Rehab.   The patient is awaiting approval of his FMLA in order to start cardiac rehab.  He just wanted us to be aware.

## 2025-03-19 NOTE — TELEPHONE ENCOUNTER
Spoke w/ patient regarding his CR schedule. His FMLA is still under review. Once his FMLA is approved, he will call to schedule.

## 2025-03-19 NOTE — TELEPHONE ENCOUNTER
Patient called to update us that his Insurance coverage is still not resolved. Patient has not started PT and its been 2 months since  had reccomended it. Please advise.

## 2025-03-24 ENCOUNTER — CLINICAL SUPPORT (OUTPATIENT)
Dept: CARDIAC REHAB | Facility: HOSPITAL | Age: 70
End: 2025-03-24
Payer: COMMERCIAL

## 2025-03-24 DIAGNOSIS — Z95.5 STATUS POST INSERTION OF DRUG ELUTING CORONARY ARTERY STENT: Primary | ICD-10-CM

## 2025-03-24 PROCEDURE — 93798 PHYS/QHP OP CAR RHAB W/ECG: CPT

## 2025-03-24 NOTE — PROGRESS NOTES
CARDIAC REHABILITATION   ASSESSMENT AND INDIVIDUALIZED TREATMENT PLAN  30 DAY          Today's date: 3/24/2025   # of Exercise Sessions Completed:   Patient name: Carlo Samuel      : 1955  Age: 70 y.o.       MRN: [id number]  Referring Physician: Dr. Jenifer Avendano DO  Cardiologist: Dr. Chuy Rajput MD  Provider: [facility]  Clinician: Erika Sacks, MS        Treatment is tailored to this patient's individual needs.  The ITP was reviewed with the patient and all questions were answered to their satisfaction.  Additional ITP documentation can be found electronically including daily and monthly exercise summaries, daily session notes with ECG summaries, education notes, daily medication reconciliation, and daily physician supervision.      SUMMARY 2025    Resting BP  116/66 - 124/60,  HR 67 - 68  Exercise /76 - 152/70,  HR 98 - 108  Exercise session details:  36 minutes,  1.9 METs  Telemetry:  NSR  Symptoms: Asymptomatic  Home exercise/ADLs: Patient has not resumed any HEP. He returned to work w/o issues.   Patient's subjective report of progress: No progress to report yet.  Clinical Comments: Patient was waiting to get [facility] paperwork approved before he was able to begin CR. He completed 1 exercise session in this 30 day reporting window. He will be progressed as tolerated.         Dx: S/P PCI w/SURI to prox and mid LAD 2/3/2025    Description of Diagnosis: Cardiac Cath performed 2/3/2025 due to persistent angina despite increased medical therapy. S/p IVUS guided PCI with SURI x 2 to the Prox-Mid LAD lesion. Patient doing well, denies any recurrent symptoms.     Cardiac catheterization 2024   Mid LAD with a hemodynamically significant, calcified lesion warranting GDMT initiation and PCI with likely IVL if refractory CP    Prox RCA with patent prior  Stent and only mild ISR    LVEDP is normal without gradient with minimal gradient on LV-AO pullback     Echo  5/23/2024  EF 49%, moderate diastolic dysfunction    Date of onset: 2/3/2025  Other Cardiac History: CAD, Stable Angina, HTN, HLD, Hx Smoking, Hx SURI 2009 and  Hx Cardiac Cath 2024      ASSESSMENT    Medical History:   Past Medical History:   Diagnosis Date    Coronary artery disease     s/p stenting; s/p SURI prox LAD-mid LAD 2/3/2025    History of epidural anesthesia     back L3/L4    Hyperlipidemia     Hypertension        Family History:  Family History   Problem Relation Age of Onset    Rheumatic fever Father     Heart disease Father     Cancer Father     Lung cancer Father        Allergies:   Patient has no known allergies.    Current Medications:   Current Outpatient Medications   Medication Sig Dispense Refill    amLODIPine (NORVASC) 5 mg tablet Take 1 tablet (5 mg total) by mouth daily 90 tablet 3    aspirin 81 MG tablet Take 81 mg by mouth daily       Calcium-Magnesium-Vitamin D (CALCIUM MAGNESIUM PO) Take by mouth      DULoxetine (CYMBALTA) 20 mg capsule Take 20 mg by mouth every evening      DULoxetine (CYMBALTA) 60 mg delayed release capsule Take 60 mg by mouth daily      ezetimibe (ZETIA) 10 mg tablet Take 1 tablet (10 mg total) by mouth daily 90 tablet 3    gabapentin (NEURONTIN) 300 mg capsule Take 300 mg by mouth daily      hydrOXYzine HCL (ATARAX) 10 mg tablet Take 10 mg by mouth 2 (two) times a day as needed for itching, allergies or anxiety      isosorbide mononitrate (IMDUR) 30 mg 24 hr tablet Take 1 tablet (30 mg total) by mouth daily 90 tablet 3    metoprolol succinate (TOPROL-XL) 25 mg 24 hr tablet Take 1 tablet (25 mg total) by mouth daily 90 tablet 3    Multiple Vitamin (MULTIVITAMIN) tablet Take 1 tablet by mouth daily      nitroglycerin (NITROSTAT) 0.4 mg SL tablet Place 1 tablet (0.4 mg total) under the tongue every 5 (five) minutes as needed for chest pain 25 tablet 3    rosuvastatin (CRESTOR) 40 MG tablet Take 1 tablet (40 mg total) by mouth daily 90 tablet 3    tamsulosin (FLOMAX) 0.4  mg Take 0.8 mg by mouth daily with dinner      ticagrelor (BRILINTA) 90 MG Take 1 tablet (90 mg total) by mouth every 12 (twelve) hours 180 tablet 2    tiZANidine (ZANAFLEX) 4 mg tablet Take 4 mg by mouth as needed for muscle spasms       No current facility-administered medications for this visit.       Medication compliance: Yes   Comments: Pt reports to be compliant with medications.    Physical Limitations: Weakness, gait dysfunction and history lumbar spine fusion. Patient also has history of TKR and shoulder arthroplasty.     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    30 day, 3/24/2025: Has not resumed HEP yet  Other Comments:       SMART Exercise Goals:   10% improvement in functional capacity based on max METs achieved in initial fitness assessment  reduced dyspnea with physical activity    improved DASI score by 10%  increased exercise capacity by 40% based on peak METs tolerated in cardiac rehab exercise session  improved 6MWT distance by 10%    Patient Specific  "EXERCISE GOALS:       Ability to ambulate long distances w/out SOB  Tolerate moderate ADLs w/out symptoms  Return to work full time/duty    Functional Capacity Screening Tool:  [facility] Activity Status Index:  4.64 METs    NUTRITION ASSESSMENT:    Initial Weight:  234  Current Weight: 234    Height:   Ht Readings from Last 1 Encounters:   02/18/25 5' 7\" (1.702 m)       Rate Your Plate Score: 60/81    Diabetes: N/A  A1c: 5.9    last measured: 2/4/2025    Lipid management: Discussed diet and lipid management and Last lipid profile 4/27/2024  Chol 177    HDL 78  LDL 78    Current Dietary Habits:  Follows a low sodium diet and hydrates regularly throughout the day. He been limiting sugar and saturated fats.     SMART Nutrition Goals:   eat whole grain breads, brown rice and whole grain cereals, eat 5 or more servings of fruits and vegetables a day, eat red meat once a week or less, choose lean beef or rarely eat beef, eat chicken and fish that is not fried, never add salt to food when cooking or at the table, choose low sodium canned, frozen/packaged foods or rarely/never eat, and rarely/never eat salty snacks    Patient Specific NUTRITION GOALS:     1. Weight loss, does not have a goal weight at this time   2. Reduce red meat intake   3. Increase intake of whole grains    Drug/Alcohol Use:   No      PSYCHOSOCIAL ASSESSMENT:    Date of last Assessment:  2/25/2025  Depression screening:  PHQ-9 = 9    Interpretation:  5-9 = Mild Depression  Anxiety screening:  MARINO-7 = 8  Interpretation: 5-9 = Mild anxiety    Pt self-report of depression and anxiety   Patient reports they are coping well with good social support and denies depression or anxiety  compliant with medical therapy for depression/anxiety  Currently takes Cymbalta    Self-reported stress level:  7/10   Stressors:  Health, finances, employment and recent loss of his MIL  Stress Management Tools: read, exercise, spend time outside, and enjoy a hobby    SMART " "Psychosocial Goals:     Physical Fitness in [facility] Score < 3, Daily Activity in [facility] Score < 3, Quality of Life in [facility] Score < 3 , improved sleeping habits, and feel less tired with more energy    Patient Specific PSYCHOCOSOCIAL GOALS:    Reduce stress level to < 5/10  Improve sleep  Compliance w/current medication    Quality of Life Screen:  (Higher score indicates disease impact on QOL)  [facility] COOP score: 30/45     Social Support:   spouse  Community/Social Activities: Activities w/wife, especially outdoors. He states he can't wait for the weather to improve so he can spend more time outdoors.      Psychosocial Assessment as it relates to rehabilitation:   Patient denies issues with his family or home life that may affect their rehabilitation efforts.       OTHER CORE COMPONENT ASSESSMENT:    Tobacco Use:     Pt quit 2003   and has abstained    Anginal Symptoms:   \"It felt like someone was squeezing my neck, choking me\"   NTG use: Compliant with carrying NTG, Understands proper use, and Pt has not used NTG since event    SMART Goals:   consistent, controlled resting BP < 130/80, medication compliance, and reduced angina    Patient Specific CORE COMPONENT GOALS:    Reduce reliance on medication  Monitor vitals at home  Remain smoke free and continue to avoid second hand smoke/other respiratory irritants    INDIVIDUALIZED TREATMENT PLAN      EXERCISE GOALS and PLAN      Progress toward Exercise goals:   Pt is progressing and showing improvement  toward the following goals:  began attending CR in this 30 day reporting window.  , Will continue to educate and progress as tolerated.    Exercise Plan:    education on home exercise guidelines, home exercise 30+ mins 2 days opposite CR, and Group class: Risk Factors for Heart Disease    The patient was counseled on exercise guidelines to achieve a minimum of 150 mins/wk of moderate intensity (RPE 4-6) exercise and encouraged to add 1-2 days of exercise on " opposite days of cardiac rehab as tolerated.       PHYSICIAN PRESCRIBED EXERCISE:    Current Aerobic Exercise Prescription:      Frequency: 2-3 days/week   Supplement with home exercise 2+ days/wk as tolerated       Minutes: 36         METS: 1.9           HR: RHR +30-40bpm   RPE: 4-6/10         Modalities: Treadmill, UBE, NuStep, and Recumbent bike     Exercise workloads will be progressed gradually as tolerated, within limits of patient's ability, and according to the patient's response to the exercise program.      Aerobic Exercise Prescription Plan for Progression   Frequency: 2-3 days/week of cardiac rehab       Supplement with home exercise 2+ days/wk as tolerated    Minutes: 36-45     >150 mins/wk of moderate intensity exercise   METS: 2-3   HR: RHR +30-40bpm     RPE: 4-6/10   Modalities: Treadmill, UBE, NuStep, and Recumbent bike    Strength trainin-3 days / week  12-15 repetitions  1-2 sets per modality    Modalities: Leg Press, Chest Press, Pull Downs, and UE Free Weights    Home Exercise:Plans to resume bike riding and exercise at his local fitness center when feeling a little better.      Exercise Education: benefit of exercise for CAD risk factors, home exercise guidelines, and RPE scale     Readiness to change: Action:  (Changing behavior)      NUTRITION GOALS AND PLAN      Nutritional   Reviewed patient's Rate your Plate. Discussed key elements of heart healthy eating. Reviewed patient goals for dietary modifications and their clinical implications.  Reviewed most recent lipid profile.     Patient's progress toward Nutrition goals:    Pt is progressing and showing improvement  toward the following goals:  Eating a heart healthy diet.  , Pt has not made progress toward the following goals: No weight loss. , Will continue to educate and progress as tolerated.      Nutrition Plan:   group class: Reading Food Labels, increase intake of whole grains, replace refined flours with whole grains, increase  daily intake of fruits and vegetables, choose lean red meat, cook without fats or oils, never/rarely eat fried foods, choose low sodium canned, frozen, packaged foods or rarely eat these foods, rarely/never eat salty snacks, and choose low sugar desserts and sweets    Measurable goals were based Rate Your Plate Dietary Self-Assessment. These are the areas in which the patient could score higher on the assessment.  Goals include recommendations for a heart healthy diet based on American Heart Association.    Nutrition Education:   heart healthy eating principles  low sodium diet  maintaining hydration    Readiness to change: Preparation:  (Getting ready to change)       PSYCHOSOCIAL GOALS AND PLAN    Psychosocial Assessment as it relates to rehabilitation:   Patient denies issues with his/her family or home life that may affect their rehabilitation efforts.     Patient's progress toward Psychosocial goals:    Pt has not made progress toward the following goals: Stress level still elevated at 7/10 due to work and trying to get his FMLA approved. , Will continue to educate and progress as tolerated.    Psychosocial Intervention/plan:   Class: Stress and Your Health, Practice relaxation techniques, Exercise, and Spend time outdoors    Psychosocial Education: benefits of a positive support system and class:  Stress and Your Health     Information to utilize [employer] was provided as well as contact information for counseling through  Behavioral Health and group psychotherapy groups available.    Readiness to change: Preparation:  (Getting ready to change)       OTHER CORE COMPONENTS GOALS and PLAN      Blood Pressure will be monitored throughout the program and cardiologist will be notified of elevated trends.    Pt will be encouraged to monitor home BP if advised by cardiologist.    Tobacco Plan:   Pt quit 2003 and has abstained since quitting.      Progress toward Core Component goals:   Pt is progressing and  showing improvement  toward the following goals:  Remains smoke free, has been asymptomatic, states his BP at home is well controlled.  , Will continue to educate and progress as tolerated.    Other Core Components Intervention:   group class: Understanding Heart Disease, medication compliance, avoid places with second hand smoke, avoid processed foods, and engage in regular exercise    Group and Individual Education:  components of blood pressure management, low sodium diet and heart failure, and proper use of sublingual NTG    Readiness to change: Preparation:  (Getting ready to change)

## 2025-03-26 ENCOUNTER — CLINICAL SUPPORT (OUTPATIENT)
Dept: CARDIAC REHAB | Facility: HOSPITAL | Age: 70
End: 2025-03-26
Payer: COMMERCIAL

## 2025-03-26 DIAGNOSIS — Z95.5 STATUS POST INSERTION OF DRUG ELUTING CORONARY ARTERY STENT: ICD-10-CM

## 2025-03-26 PROCEDURE — 93798 PHYS/QHP OP CAR RHAB W/ECG: CPT

## 2025-03-28 ENCOUNTER — APPOINTMENT (OUTPATIENT)
Dept: CARDIAC REHAB | Facility: HOSPITAL | Age: 70
End: 2025-03-28
Payer: COMMERCIAL

## 2025-04-07 ENCOUNTER — CLINICAL SUPPORT (OUTPATIENT)
Dept: CARDIAC REHAB | Facility: HOSPITAL | Age: 70
End: 2025-04-07
Payer: COMMERCIAL

## 2025-04-07 DIAGNOSIS — Z95.5 STATUS POST INSERTION OF DRUG ELUTING CORONARY ARTERY STENT: ICD-10-CM

## 2025-04-07 PROCEDURE — 93798 PHYS/QHP OP CAR RHAB W/ECG: CPT

## 2025-04-09 ENCOUNTER — CLINICAL SUPPORT (OUTPATIENT)
Dept: CARDIAC REHAB | Facility: HOSPITAL | Age: 70
End: 2025-04-09
Payer: COMMERCIAL

## 2025-04-09 DIAGNOSIS — Z95.5 STATUS POST INSERTION OF DRUG ELUTING CORONARY ARTERY STENT: ICD-10-CM

## 2025-04-09 PROCEDURE — 93798 PHYS/QHP OP CAR RHAB W/ECG: CPT

## 2025-04-14 ENCOUNTER — CLINICAL SUPPORT (OUTPATIENT)
Dept: CARDIAC REHAB | Facility: HOSPITAL | Age: 70
End: 2025-04-14
Payer: COMMERCIAL

## 2025-04-14 DIAGNOSIS — Z95.5 STATUS POST INSERTION OF DRUG ELUTING CORONARY ARTERY STENT: ICD-10-CM

## 2025-04-14 PROCEDURE — 93798 PHYS/QHP OP CAR RHAB W/ECG: CPT

## 2025-04-15 ENCOUNTER — TELEPHONE (OUTPATIENT)
Dept: OTHER | Facility: OTHER | Age: 70
End: 2025-04-15

## 2025-04-15 NOTE — TELEPHONE ENCOUNTER
Comfort  from [employer] needs clarification on patients leave of absence paperwork. They need medical frequency and duration of leave. Call back number is [phone]

## 2025-04-16 ENCOUNTER — CLINICAL SUPPORT (OUTPATIENT)
Dept: CARDIAC REHAB | Facility: HOSPITAL | Age: 70
End: 2025-04-16
Payer: COMMERCIAL

## 2025-04-16 DIAGNOSIS — Z95.5 STATUS POST INSERTION OF DRUG ELUTING CORONARY ARTERY STENT: ICD-10-CM

## 2025-04-16 PROCEDURE — 93798 PHYS/QHP OP CAR RHAB W/ECG: CPT

## 2025-04-21 ENCOUNTER — CLINICAL SUPPORT (OUTPATIENT)
Dept: CARDIAC REHAB | Facility: HOSPITAL | Age: 70
End: 2025-04-21
Payer: COMMERCIAL

## 2025-04-21 DIAGNOSIS — Z95.5 STATUS POST INSERTION OF DRUG ELUTING CORONARY ARTERY STENT: Primary | ICD-10-CM

## 2025-04-21 PROCEDURE — 93798 PHYS/QHP OP CAR RHAB W/ECG: CPT

## 2025-04-21 NOTE — PROGRESS NOTES
CARDIAC REHABILITATION   ASSESSMENT AND INDIVIDUALIZED TREATMENT PLAN  60 DAY          Today's date: 2025   # of Exercise Sessions Completed:   Patient name: Carlo Samuel      : 1955  Age: 70 y.o.       MRN: [id number]  Referring Physician: Jenifer Avendano DO  Cardiologist: Chuy Rajput MD  Provider: [facility]  Clinician: Erika Sacks, MS        Treatment is tailored to this patient's individual needs.  The ITP was reviewed with the patient and all questions were answered to their satisfaction.  Additional ITP documentation can be found electronically including daily and monthly exercise summaries, daily session notes with ECG summaries, education notes, daily medication reconciliation, and daily physician supervision.      SUMMARY 2025    Resting BP  116/66 - 126/64,  HR 65 - 88  Exercise /70 - 152/76,  HR 97 - 104  Exercise session details:  45 minutes,  2.7 METs  Telemetry:  NSR  Symptoms: Asymptomatic  Home exercise/ADLs: Patient has not resumed any HEP. He returned to work w/o issues.   Patient's subjective report of progress: Carlo is starting to feel a better now that he has a few sessions completed.   Clinical Comments: His program will continue to be progressed as tolerated.         Dx: S/P PCI w/SURI to prox and mid LAD 2/3/2025    Description of Diagnosis: Cardiac Cath performed 2/3/2025 due to persistent angina despite increased medical therapy. S/p IVUS guided PCI with SURI x 2 to the Prox-Mid LAD lesion. Patient doing well, denies any recurrent symptoms.     Cardiac catheterization 2024   Mid LAD with a hemodynamically significant, calcified lesion warranting GDMT initiation and PCI with likely IVL if refractory CP    Prox RCA with patent prior  Stent and only mild ISR    LVEDP is normal without gradient with minimal gradient on LV-AO pullback     Echo 2024  EF 49%, moderate diastolic dysfunction    Date of onset: 2/3/2025  Other Cardiac  History: CAD, Stable Angina, HTN, HLD, Hx Smoking, Hx SURI 2009 and  Hx Cardiac Cath 2024      ASSESSMENT    Medical History:   Past Medical History:   Diagnosis Date    Coronary artery disease     s/p stenting; s/p SURI prox LAD-mid LAD 2/3/2025    History of epidural anesthesia     back L3/L4    Hyperlipidemia     Hypertension        Family History:  Family History   Problem Relation Age of Onset    Rheumatic fever Father     Heart disease Father     Cancer Father     Lung cancer Father        Allergies:   Patient has no known allergies.    Current Medications:   Current Outpatient Medications   Medication Sig Dispense Refill    amLODIPine (NORVASC) 5 mg tablet Take 1 tablet (5 mg total) by mouth daily 90 tablet 3    aspirin 81 MG tablet Take 81 mg by mouth daily       Calcium-Magnesium-Vitamin D (CALCIUM MAGNESIUM PO) Take by mouth      DULoxetine (CYMBALTA) 20 mg capsule Take 20 mg by mouth every evening      DULoxetine (CYMBALTA) 60 mg delayed release capsule Take 60 mg by mouth daily      ezetimibe (ZETIA) 10 mg tablet Take 1 tablet (10 mg total) by mouth daily 90 tablet 3    gabapentin (NEURONTIN) 300 mg capsule Take 300 mg by mouth daily      hydrOXYzine HCL (ATARAX) 10 mg tablet Take 10 mg by mouth 2 (two) times a day as needed for itching, allergies or anxiety      isosorbide mononitrate (IMDUR) 30 mg 24 hr tablet Take 1 tablet (30 mg total) by mouth daily 90 tablet 3    metoprolol succinate (TOPROL-XL) 25 mg 24 hr tablet Take 1 tablet (25 mg total) by mouth daily 90 tablet 3    Multiple Vitamin (MULTIVITAMIN) tablet Take 1 tablet by mouth daily      nitroglycerin (NITROSTAT) 0.4 mg SL tablet Place 1 tablet (0.4 mg total) under the tongue every 5 (five) minutes as needed for chest pain 25 tablet 3    rosuvastatin (CRESTOR) 40 MG tablet Take 1 tablet (40 mg total) by mouth daily 90 tablet 3    tamsulosin (FLOMAX) 0.4 mg Take 0.8 mg by mouth daily with dinner      ticagrelor (BRILINTA) 90 MG Take 1 tablet (90  mg total) by mouth every 12 (twelve) hours 180 tablet 2    tiZANidine (ZANAFLEX) 4 mg tablet Take 4 mg by mouth as needed for muscle spasms       No current facility-administered medications for this visit.       Medication compliance: Yes   Comments: Pt reports to be compliant with medications.    Physical Limitations: Weakness, gait dysfunction and history lumbar spine fusion. Patient also has history of TKR and shoulder arthroplasty.     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    30 day, 3/24/2025: Has not resumed HEP yet   60 day, 4/21/2025: Has not resumed HEP yet  Other Comments:       SMART Exercise Goals:   10% improvement in functional capacity based on max METs achieved in initial fitness assessment  reduced dyspnea with physical activity    improved DASI score by 10%  increased exercise capacity by 40% based on peak METs tolerated in cardiac rehab exercise session  improved 6MWT distance by 10%    Patient Specific EXERCISE GOALS:       Ability to ambulate long  "distances w/out SOB  Tolerate moderate ADLs w/out symptoms  Return to work full time/duty    Functional Capacity Screening Tool:  [facility] Activity Status Index:  4.64 METs    NUTRITION ASSESSMENT:    Initial Weight:  234  Current Weight: 234    Height:   Ht Readings from Last 1 Encounters:   02/18/25 5' 7\" (1.702 m)       Rate Your Plate Score: 60/81    Diabetes: N/A  A1c: 5.9    last measured: 2/4/2025    Lipid management: Discussed diet and lipid management and Last lipid profile 4/27/2024  Chol 177    HDL 78  LDL 78    Current Dietary Habits:  Follows a low sodium diet and hydrates regularly throughout the day. He been limiting sugar and saturated fats.     SMART Nutrition Goals:   eat whole grain breads, brown rice and whole grain cereals, eat 5 or more servings of fruits and vegetables a day, eat red meat once a week or less, choose lean beef or rarely eat beef, eat chicken and fish that is not fried, never add salt to food when cooking or at the table, choose low sodium canned, frozen/packaged foods or rarely/never eat, and rarely/never eat salty snacks    Patient Specific NUTRITION GOALS:     1. Weight loss, does not have a goal weight at this time   2. Reduce red meat intake   3. Increase intake of whole grains    Drug/Alcohol Use:   No      PSYCHOSOCIAL ASSESSMENT:    Date of last Assessment:  2/25/2025  Depression screening:  PHQ-9 = 9    Interpretation:  5-9 = Mild Depression  Anxiety screening:  MARINO-7 = 8  Interpretation: 5-9 = Mild anxiety    Pt self-report of depression and anxiety   Patient reports they are coping well with good social support and denies depression or anxiety  compliant with medical therapy for depression/anxiety  Currently takes Cymbalta    Self-reported stress level:  5/10   Stressors:  Health, finances, employment and recent loss of his MIL  Stress Management Tools: read, exercise, spend time outside, and enjoy a hobby    SMART Psychosocial Goals:     Physical Fitness in " "[facility] Score < 3, Daily Activity in [facility] Score < 3, Quality of Life in [facility] Score < 3 , improved sleeping habits, and feel less tired with more energy    Patient Specific PSYCHOCOSOCIAL GOALS:    Reduce stress level to < 5/10  Improve sleep  Compliance w/current medication    Quality of Life Screen:  (Higher score indicates disease impact on QOL)  [facility] COOP score: 30/45     Social Support:   spouse  Community/Social Activities: Activities w/wife, especially outdoors. He states he can't wait for the weather to improve so he can spend more time outdoors.      Psychosocial Assessment as it relates to rehabilitation:   Patient denies issues with his family or home life that may affect their rehabilitation efforts.       OTHER CORE COMPONENT ASSESSMENT:    Tobacco Use:     Pt quit 2003   and has abstained    Anginal Symptoms:   \"It felt like someone was squeezing my neck, choking me\"   NTG use: Compliant with carrying NTG, Understands proper use, and Pt has not used NTG since event    SMART Goals:   consistent, controlled resting BP < 130/80, medication compliance, and reduced angina    Patient Specific CORE COMPONENT GOALS:    Reduce reliance on medication  Monitor vitals at home  Remain smoke free and continue to avoid second hand smoke/other respiratory irritants    INDIVIDUALIZED TREATMENT PLAN      EXERCISE GOALS and PLAN      Progress toward Exercise goals:   Pt is progressing and showing improvement  toward the following goals:  has been compliant in attending his scheduled cardiac rehab sessions, MET level slowly increased.  , Will continue to educate and progress as tolerated.    Exercise Plan:    education on home exercise guidelines, home exercise 30+ mins 2 days opposite CR, and Group class: Risk Factors for Heart Disease    The patient was counseled on exercise guidelines to achieve a minimum of 150 mins/wk of moderate intensity (RPE 4-6) exercise and encouraged to add 1-2 days of exercise " on opposite days of cardiac rehab as tolerated.       PHYSICIAN PRESCRIBED EXERCISE:    Current Aerobic Exercise Prescription:      Frequency: 2-3 days/week   Supplement with home exercise 2+ days/wk as tolerated       Minutes: 45         METS: 2.7           HR: RHR +30-40bpm   RPE: 4-6/10         Modalities: Treadmill, UBE, NuStep, and Recumbent bike     Exercise workloads will be progressed gradually as tolerated, within limits of patient's ability, and according to the patient's response to the exercise program.      Aerobic Exercise Prescription Plan for Progression   Frequency: 2-3 days/week of cardiac rehab       Supplement with home exercise 2+ days/wk as tolerated    Minutes: 45-50     >150 mins/wk of moderate intensity exercise   METS: 2.7-3.5   HR: RHR +30-40bpm     RPE: 4-6/10   Modalities: Treadmill, UBE, NuStep, and Recumbent bike    Strength trainin-3 days / week  12-15 repetitions  1-2 sets per modality    Modalities: Leg Press, Chest Press, Pull Downs, and UE Free Weights    Home Exercise:Plans to resume bike riding and exercise at his local fitness center when feeling a little better.      Exercise Education: benefit of exercise for CAD risk factors, home exercise guidelines, and RPE scale     Readiness to change: Action:  (Changing behavior)      NUTRITION GOALS AND PLAN      Nutritional   Reviewed patient's Rate your Plate. Discussed key elements of heart healthy eating. Reviewed patient goals for dietary modifications and their clinical implications.  Reviewed most recent lipid profile.     Patient's progress toward Nutrition goals:    Pt is progressing and showing improvement  toward the following goals:  Eating a heart healthy diet.  , Pt has not made progress toward the following goals: No weight loss. , Will continue to educate and progress as tolerated.      Nutrition Plan:   group class: Reading Food Labels, increase intake of whole grains, replace refined flours with whole grains,  increase daily intake of fruits and vegetables, choose lean red meat, cook without fats or oils, never/rarely eat fried foods, choose low sodium canned, frozen, packaged foods or rarely eat these foods, rarely/never eat salty snacks, and choose low sugar desserts and sweets    Measurable goals were based Rate Your Plate Dietary Self-Assessment. These are the areas in which the patient could score higher on the assessment.  Goals include recommendations for a heart healthy diet based on American Heart Association.    Nutrition Education:   heart healthy eating principles  low sodium diet  maintaining hydration    Readiness to change: Action:  (Changing behavior)      PSYCHOSOCIAL GOALS AND PLAN    Psychosocial Assessment as it relates to rehabilitation:   Patient denies issues with his/her family or home life that may affect their rehabilitation efforts.     Patient's progress toward Psychosocial goals:    Pt is progressing and showing improvement  toward the following goals:  Stress level reduced to 5/10 from 7/10.  , Will continue to educate and progress as tolerated.    Psychosocial Intervention/plan:   Class: Stress and Your Health, Practice relaxation techniques, Exercise, and Spend time outdoors    Psychosocial Education: benefits of a positive support system and class:  Stress and Your Health     Information to utilize [employer] was provided as well as contact information for counseling through  Behavioral Health and group psychotherapy groups available.    Readiness to change: Action:  (Changing behavior)      OTHER CORE COMPONENTS GOALS and PLAN      Blood Pressure will be monitored throughout the program and cardiologist will be notified of elevated trends.    Pt will be encouraged to monitor home BP if advised by cardiologist.    Tobacco Plan:   Pt quit 2003 and has abstained since quitting.      Progress toward Core Component goals:   Pt is progressing and showing improvement  toward the following  goals:  Remains smoke free, has been asymptomatic, states his BP at home is well controlled.  , Will continue to educate and progress as tolerated.    Other Core Components Intervention:   group class: Understanding Heart Disease, medication compliance, avoid places with second hand smoke, avoid processed foods, and engage in regular exercise    Group and Individual Education:  components of blood pressure management, low sodium diet and heart failure, and proper use of sublingual NTG    Readiness to change: Preparation:  (Getting ready to change)

## 2025-04-23 ENCOUNTER — CLINICAL SUPPORT (OUTPATIENT)
Dept: CARDIAC REHAB | Facility: HOSPITAL | Age: 70
End: 2025-04-23
Payer: COMMERCIAL

## 2025-04-23 DIAGNOSIS — Z95.5 STATUS POST INSERTION OF DRUG ELUTING CORONARY ARTERY STENT: ICD-10-CM

## 2025-04-23 PROCEDURE — 93798 PHYS/QHP OP CAR RHAB W/ECG: CPT

## 2025-04-28 ENCOUNTER — CLINICAL SUPPORT (OUTPATIENT)
Dept: CARDIAC REHAB | Facility: HOSPITAL | Age: 70
End: 2025-04-28
Payer: COMMERCIAL

## 2025-04-28 DIAGNOSIS — Z95.5 STATUS POST INSERTION OF DRUG ELUTING CORONARY ARTERY STENT: ICD-10-CM

## 2025-04-28 PROCEDURE — 93798 PHYS/QHP OP CAR RHAB W/ECG: CPT

## 2025-04-30 ENCOUNTER — CLINICAL SUPPORT (OUTPATIENT)
Dept: CARDIAC REHAB | Facility: HOSPITAL | Age: 70
End: 2025-04-30
Payer: COMMERCIAL

## 2025-04-30 DIAGNOSIS — Z95.5 STATUS POST INSERTION OF DRUG ELUTING CORONARY ARTERY STENT: ICD-10-CM

## 2025-04-30 PROCEDURE — 93798 PHYS/QHP OP CAR RHAB W/ECG: CPT

## 2025-05-05 ENCOUNTER — APPOINTMENT (OUTPATIENT)
Dept: CARDIAC REHAB | Facility: HOSPITAL | Age: 70
End: 2025-05-05
Payer: COMMERCIAL

## 2025-05-07 ENCOUNTER — CLINICAL SUPPORT (OUTPATIENT)
Dept: CARDIAC REHAB | Facility: HOSPITAL | Age: 70
End: 2025-05-07
Payer: COMMERCIAL

## 2025-05-07 DIAGNOSIS — Z95.5 STATUS POST INSERTION OF DRUG ELUTING CORONARY ARTERY STENT: ICD-10-CM

## 2025-05-07 PROCEDURE — 93798 PHYS/QHP OP CAR RHAB W/ECG: CPT

## 2025-05-09 ENCOUNTER — CLINICAL SUPPORT (OUTPATIENT)
Dept: CARDIAC REHAB | Facility: HOSPITAL | Age: 70
End: 2025-05-09
Payer: COMMERCIAL

## 2025-05-09 DIAGNOSIS — Z95.5 STATUS POST INSERTION OF DRUG ELUTING CORONARY ARTERY STENT: ICD-10-CM

## 2025-05-09 PROCEDURE — 93798 PHYS/QHP OP CAR RHAB W/ECG: CPT

## 2025-05-12 ENCOUNTER — CLINICAL SUPPORT (OUTPATIENT)
Dept: CARDIAC REHAB | Facility: HOSPITAL | Age: 70
End: 2025-05-12
Payer: COMMERCIAL

## 2025-05-12 DIAGNOSIS — Z95.5 STATUS POST INSERTION OF DRUG ELUTING CORONARY ARTERY STENT: ICD-10-CM

## 2025-05-12 PROCEDURE — 93798 PHYS/QHP OP CAR RHAB W/ECG: CPT

## 2025-05-14 ENCOUNTER — APPOINTMENT (OUTPATIENT)
Dept: CARDIAC REHAB | Facility: HOSPITAL | Age: 70
End: 2025-05-14
Payer: COMMERCIAL

## 2025-05-16 ENCOUNTER — CLINICAL SUPPORT (OUTPATIENT)
Dept: CARDIAC REHAB | Facility: HOSPITAL | Age: 70
End: 2025-05-16
Payer: COMMERCIAL

## 2025-05-16 DIAGNOSIS — Z95.5 STATUS POST INSERTION OF DRUG ELUTING CORONARY ARTERY STENT: Primary | ICD-10-CM

## 2025-05-16 PROCEDURE — 93798 PHYS/QHP OP CAR RHAB W/ECG: CPT

## 2025-05-19 ENCOUNTER — CLINICAL SUPPORT (OUTPATIENT)
Dept: CARDIAC REHAB | Facility: HOSPITAL | Age: 70
End: 2025-05-19
Payer: COMMERCIAL

## 2025-05-19 DIAGNOSIS — Z95.5 STATUS POST INSERTION OF DRUG ELUTING CORONARY ARTERY STENT: Primary | ICD-10-CM

## 2025-05-19 PROCEDURE — 93798 PHYS/QHP OP CAR RHAB W/ECG: CPT

## 2025-05-20 NOTE — PROGRESS NOTES
CARDIAC REHABILITATION   ASSESSMENT AND INDIVIDUALIZED TREATMENT PLAN  90 DAY          Today's date: 2025   # of Exercise Sessions Completed:   Patient name: Carlo Samuel      : 1955  Age: 70 y.o.       MRN: [id number]  Referring Physician: Jenifer Avendano DO  Cardiologist: Chuy Rajput MD  Provider: [facility]  Clinician: Mahogany Wallace, MPT, CCRP      Treatment is tailored to this patient's individual needs.  The ITP was reviewed with the patient and all questions were answered to their satisfaction.  Additional ITP documentation can be found electronically including daily and monthly exercise summaries, daily session notes with ECG summaries, education notes, daily medication reconciliation, and daily physician supervision.      SUMMARY May 20, 2025    Resting BP  118/58 - 126/64,  HR 60 - 79  Exercise /74 - 152/68,  HR 93 - 102  Exercise session details:  45 minutes,  2.7 METs  Telemetry:  NSR  Symptoms: Asymptomatic  Home exercise/ADLs: Patient has not resumed any HEP. He returned to work w/o issues. He is able to drive, community ambulate and perform all ADLs at home w/little issue.   Patient's subjective report of progress: Carlo is feeling better and more confident in his abilities. He stated he is working on reducing/eliminating his risk factors.  He is leased w/his progress thus fat in CR. He plans to continue until program completion.   Clinical Comments: He has been compliant attending his sessions. He gives great effort during each visit. He is able to work w/in his THRs. He has correct hemodynamic responses to the activity. He has been asymptomatic. His orthopedic back issues are his limiting factors. His program will be progressed as he is able to tolerate.       Dx: S/P PCI w/SURI to prox and mid LAD 2/3/2025    Description of Diagnosis: Cardiac Cath performed 2/3/2025 due to persistent angina despite increased medical therapy. S/p IVUS guided PCI with SURI x 2  to the Prox-Mid LAD lesion. Patient doing well, denies any recurrent symptoms.     Cardiac catheterization 5/23/2024   Mid LAD with a hemodynamically significant, calcified lesion warranting GDMT initiation and PCI with likely IVL if refractory CP    Prox RCA with patent prior 2009 Stent and only mild ISR    LVEDP is normal without gradient with minimal gradient on LV-AO pullback     Echo 5/23/2024  EF 49%, moderate diastolic dysfunction    Date of onset: 2/3/2025  Other Cardiac History: CAD, Stable Angina, HTN, HLD, Hx Smoking, Hx SURI 2009 and  Hx Cardiac Cath 2024      ASSESSMENT    Medical History:   Past Medical History:   Diagnosis Date    Coronary artery disease     s/p stenting; s/p SURI prox LAD-mid LAD 2/3/2025    History of epidural anesthesia     back L3/L4    Hyperlipidemia     Hypertension        Family History:  Family History   Problem Relation Age of Onset    Rheumatic fever Father     Heart disease Father     Cancer Father     Lung cancer Father        Allergies:   Patient has no known allergies.    Current Medications:   Current Outpatient Medications   Medication Sig Dispense Refill    amLODIPine (NORVASC) 5 mg tablet Take 1 tablet (5 mg total) by mouth daily 90 tablet 3    aspirin 81 MG tablet Take 81 mg by mouth in the morning.      Calcium-Magnesium-Vitamin D (CALCIUM MAGNESIUM PO) Take by mouth      DULoxetine (CYMBALTA) 20 mg capsule Take 20 mg by mouth every evening      DULoxetine (CYMBALTA) 60 mg delayed release capsule Take 60 mg by mouth in the morning.      ezetimibe (ZETIA) 10 mg tablet Take 1 tablet (10 mg total) by mouth daily (Patient not taking: Reported on 5/14/2025) 90 tablet 3    gabapentin (NEURONTIN) 300 mg capsule Take 300 mg by mouth in the morning.      hydrOXYzine HCL (ATARAX) 10 mg tablet Take 10 mg by mouth as needed in the morning and 10 mg as needed in the evening for itching, allergies or anxiety. (Patient not taking: Reported on 5/14/2025)      isosorbide mononitrate  (IMDUR) 30 mg 24 hr tablet Take 1 tablet (30 mg total) by mouth daily (Patient not taking: Reported on 5/14/2025) 90 tablet 3    metoprolol succinate (TOPROL-XL) 25 mg 24 hr tablet Take 1 tablet (25 mg total) by mouth daily (Patient not taking: Reported on 5/14/2025) 90 tablet 3    Multiple Vitamin (MULTIVITAMIN) tablet Take 1 tablet by mouth in the morning.      nitroglycerin (NITROSTAT) 0.4 mg SL tablet Place 1 tablet (0.4 mg total) under the tongue every 5 (five) minutes as needed for chest pain (Patient not taking: Reported on 5/14/2025) 25 tablet 3    rosuvastatin (CRESTOR) 40 MG tablet Take 1 tablet (40 mg total) by mouth daily 90 tablet 3    tamsulosin (FLOMAX) 0.4 mg Take 0.8 mg by mouth daily with dinner      ticagrelor (BRILINTA) 90 MG Take 1 tablet (90 mg total) by mouth every 12 (twelve) hours (Patient not taking: Reported on 5/14/2025) 180 tablet 2    tiZANidine (ZANAFLEX) 4 mg tablet Take 4 mg by mouth as needed for muscle spasms (Patient not taking: Reported on 5/14/2025)       No current facility-administered medications for this visit.       Medication compliance: Yes   Comments: Pt reports to be compliant with medications.    Physical Limitations: Weakness, gait dysfunction and history lumbar spine fusion. Patient also has history of TKR and shoulder arthroplasty.     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    30 day, 3/24/2025: Has not resumed HEP yet   60 day, 4/21/2025: Has not resumed HEP yet  Other Comments:       SMART Exercise Goals:   10% improvement in functional capacity based on max METs achieved in initial fitness assessment  reduced dyspnea with physical activity    improved DASI score by 10%  increased exercise capacity by 40% based on peak METs tolerated in cardiac rehab exercise session  improved 6MWT distance by 10%    Patient Specific EXERCISE GOALS:       Ability to ambulate long distances w/out SOB  Tolerate moderate ADLs w/out symptoms  Return to work full time/duty    Functional Capacity Screening Tool:  [facility] Activity Status Index:  4.64 METs    NUTRITION ASSESSMENT:    Initial Weight:  234  Current Weight: 234  5/20/2025: 222, BMI 34.77    Height:   Ht Readings from Last 1 Encounters:   05/14/25 5' 7\" (1.702 m)       Rate Your Plate Score: 60/81    Diabetes: N/A  A1c: 5.9    last measured: 2/4/2025    Lipid management: Discussed diet and lipid management and Last lipid profile 4/27/2024  Chol 177    HDL 78  LDL 78    Current Dietary Habits:  Follows a low sodium diet and hydrates regularly throughout the day. He been limiting sugar and saturated fats.     SMART Nutrition Goals:   eat whole grain breads, brown rice and whole grain cereals, eat 5 or more servings of fruits and vegetables a day, eat red meat once a week or less, choose lean beef or rarely eat beef, eat chicken and fish that is not fried, never add salt to food when cooking or at the table, choose low sodium canned, frozen/packaged foods " "or rarely/never eat, and rarely/never eat salty snacks    Patient Specific NUTRITION GOALS:     1. Weight loss, does not have a goal weight at this time   2. Reduce red meat intake   3. Increase intake of whole grains    Drug/Alcohol Use:   No      PSYCHOSOCIAL ASSESSMENT:    Date of last Assessment:  2/25/2025  Depression screening:  PHQ-9 = 9    Interpretation:  5-9 = Mild Depression  Anxiety screening:  MARINO-7 = 8  Interpretation: 5-9 = Mild anxiety    Pt self-report of depression and anxiety   Patient reports they are coping well with good social support and denies depression or anxiety  compliant with medical therapy for depression/anxiety  Currently takes Cymbalta    Self-reported stress level:  5/10   Stressors:  Health, finances, employment and recent loss of his MIL  Stress Management Tools: read, exercise, spend time outside, and enjoy a hobby    SMART Psychosocial Goals:     Physical Fitness in [facility] Score < 3, Daily Activity in [facility] Score < 3, Quality of Life in [facility] Score < 3 , improved sleeping habits, and feel less tired with more energy    Patient Specific PSYCHOCOSOCIAL GOALS:    Reduce stress level to < 5/10  Improve sleep  Compliance w/current medication    Quality of Life Screen:  (Higher score indicates disease impact on QOL)  [facility] COOP score: 30/45     Social Support:   spouse  Community/Social Activities: Activities w/wife, especially outdoors. He states he can't wait for the weather to improve so he can spend more time outdoors.      Psychosocial Assessment as it relates to rehabilitation:   Patient denies issues with his family or home life that may affect their rehabilitation efforts.       OTHER CORE COMPONENT ASSESSMENT:    Tobacco Use:     Pt quit 2003   and has abstained    Anginal Symptoms:  \"It felt like someone was squeezing my neck, choking me\"   NTG use: Compliant with carrying NTG, Understands proper use, and Pt has not used NTG since event    SMART Goals: "   consistent, controlled resting BP < 130/80, medication compliance, and reduced angina    Patient Specific CORE COMPONENT GOALS:    Reduce reliance on medication  Monitor vitals at home  Remain smoke free and continue to avoid second hand smoke/other respiratory irritants    INDIVIDUALIZED TREATMENT PLAN      EXERCISE GOALS and PLAN      Progress toward Exercise goals:   Pt is progressing and showing improvement  toward the following goals:  has been compliant in attending his scheduled cardiac rehab sessions, MET level slowly increased.  , Will continue to educate and progress as tolerated.    Exercise Plan:    education on home exercise guidelines, home exercise 30+ mins 2 days opposite CR, and Group class: Risk Factors for Heart Disease    The patient was counseled on exercise guidelines to achieve a minimum of 150 mins/wk of moderate intensity (RPE 4-6) exercise and encouraged to add 1-2 days of exercise on opposite days of cardiac rehab as tolerated.       PHYSICIAN PRESCRIBED EXERCISE:    Current Aerobic Exercise Prescription:      Frequency: 2-3 days/week   Supplement with home exercise 2+ days/wk as tolerated       Minutes: 45         METS: 2.7           HR: RHR +30-40bpm   RPE: 4-6/10         Modalities: Treadmill, UBE, NuStep, and Recumbent bike     Exercise workloads will be progressed gradually as tolerated, within limits of patient's ability, and according to the patient's response to the exercise program.      Aerobic Exercise Prescription Plan for Progression   Frequency: 2-3 days/week of cardiac rehab       Supplement with home exercise 2+ days/wk as tolerated    Minutes: 45-50     >150 mins/wk of moderate intensity exercise   METS: 2.7-3.5   HR: RHR +30-40bpm     RPE: 4-6/10   Modalities: Treadmill, UBE, NuStep, and Recumbent bike    Strength trainin-3 days / week  12-15 repetitions  1-2 sets per modality    Modalities: Leg Press, Chest Press, Pull Downs, and UE Free Weights    Home  Exercise:Plans to resume bike riding and exercise at his local fitness center when feeling a little better.      Exercise Education: benefit of exercise for CAD risk factors, home exercise guidelines, and RPE scale     Readiness to change: Action:  (Changing behavior)      NUTRITION GOALS AND PLAN      Nutritional   Reviewed patient's Rate your Plate. Discussed key elements of heart healthy eating. Reviewed patient goals for dietary modifications and their clinical implications.  Reviewed most recent lipid profile.     Patient's progress toward Nutrition goals:    Pt is progressing and showing improvement  toward the following goals:  Eating a heart healthy diet.  , Pt has not made progress toward the following goals: No weight loss. , Will continue to educate and progress as tolerated.      Nutrition Plan:   group class: Reading Food Labels, increase intake of whole grains, replace refined flours with whole grains, increase daily intake of fruits and vegetables, choose lean red meat, cook without fats or oils, never/rarely eat fried foods, choose low sodium canned, frozen, packaged foods or rarely eat these foods, rarely/never eat salty snacks, and choose low sugar desserts and sweets    Measurable goals were based Rate Your Plate Dietary Self-Assessment. These are the areas in which the patient could score higher on the assessment.  Goals include recommendations for a heart healthy diet based on American Heart Association.    Nutrition Education:   heart healthy eating principles  low sodium diet  maintaining hydration    Readiness to change: Action:  (Changing behavior)      PSYCHOSOCIAL GOALS AND PLAN    Psychosocial Assessment as it relates to rehabilitation:   Patient denies issues with his/her family or home life that may affect their rehabilitation efforts.     Patient's progress toward Psychosocial goals:    Pt is progressing and showing improvement  toward the following goals:  Stress level reduced to 5/10  from 7/10.  , Will continue to educate and progress as tolerated.    Psychosocial Intervention/plan:   Class: Stress and Your Health, Practice relaxation techniques, Exercise, and Spend time outdoors    Psychosocial Education: benefits of a positive support system and class:  Stress and Your Health     Information to utilize [employer] was provided as well as contact information for counseling through  Behavioral Health and group psychotherapy groups available.    Readiness to change: Action:  (Changing behavior)      OTHER CORE COMPONENTS GOALS and PLAN      Blood Pressure will be monitored throughout the program and cardiologist will be notified of elevated trends.    Pt will be encouraged to monitor home BP if advised by cardiologist.    Tobacco Plan:   Pt quit 2003 and has abstained since quitting.      Progress toward Core Component goals:   Pt is progressing and showing improvement  toward the following goals:  Remains smoke free, has been asymptomatic, states his BP at home is well controlled.  , Will continue to educate and progress as tolerated.    Other Core Components Intervention:   group class: Understanding Heart Disease, medication compliance, avoid places with second hand smoke, avoid processed foods, and engage in regular exercise    Group and Individual Education:  components of blood pressure management, low sodium diet and heart failure, and proper use of sublingual NTG    Readiness to change: Action:  (Changing behavior)

## 2025-05-21 ENCOUNTER — APPOINTMENT (OUTPATIENT)
Dept: CARDIAC REHAB | Facility: HOSPITAL | Age: 70
End: 2025-05-21
Payer: COMMERCIAL

## 2025-05-21 ENCOUNTER — OFFICE VISIT (OUTPATIENT)
Dept: CARDIOLOGY CLINIC | Facility: CLINIC | Age: 70
End: 2025-05-21
Payer: COMMERCIAL

## 2025-05-21 VITALS
SYSTOLIC BLOOD PRESSURE: 132 MMHG | HEART RATE: 68 BPM | DIASTOLIC BLOOD PRESSURE: 68 MMHG | BODY MASS INDEX: 36.26 KG/M2 | HEIGHT: 67 IN | WEIGHT: 231 LBS

## 2025-05-21 DIAGNOSIS — E78.2 MIXED HYPERLIPIDEMIA: ICD-10-CM

## 2025-05-21 DIAGNOSIS — I25.10 CORONARY ARTERY DISEASE INVOLVING NATIVE CORONARY ARTERY OF NATIVE HEART WITHOUT ANGINA PECTORIS: Primary | ICD-10-CM

## 2025-05-21 DIAGNOSIS — I20.89 STABLE ANGINA (HCC): ICD-10-CM

## 2025-05-21 PROCEDURE — 99214 OFFICE O/P EST MOD 30 MIN: CPT | Performed by: NURSE PRACTITIONER

## 2025-05-21 NOTE — PROGRESS NOTES
Patient ID: Carlo Samuel is a 70 y.o. male.        Plan:      Assessment & Plan  Coronary artery disease involving native coronary artery of native heart without angina pectoris  S/p SURI to pRCA and D2 6/2009 patent on recent cath  S/p SURI x 2 to proximal to mid LAD lesion, 2/3/2025  Continue asa, Brilinta, statin, BB, CCB    Mixed hyperlipidemia  Continue Crestor  Hold Zetia 2 weeks, see below  Stable angina (HCC)  Resolved post stent      Follow up Plan/Summary Comments:  Carlo is doing well from a cardiac perspective.  He will continue with cardiac rehab.    Regarding the diarrhea, I question whether that this is related to Zetia.  Will trial 2-week hold of Zetia to see if symptoms improve.  If symptoms are felt to be related to Zetia, will need to consider alternative medication (PCSK9 inhibitor) as LDL remains above goal.    If symptoms persist despite holding Zetia, will refer back to PCP for further evaluation.    Carlo will follow-up by phone in 2 weeks with an update.  Office follow-up in 6 months, sooner if needed    HPI: Carlo is seen in the office today for routine visit.    He was last seen 2/18/2025 for a hospital follow-up visit s/p stenting.    Since that time, he has been doing well.  He denies any chest discomfort, shortness of breath, dizziness/lightheadedness.    His biggest concern is diarrhea which started about 2 months ago.  He has an episode of watery, nonbloody diarrhea every morning.  He denies any other GI symptoms.  This began approximately 1 month after starting Zetia and Brilinta.  He questions whether it is a side effect of the medications.    He is actively participating in cardiac rehab and reports doing well.    Review of Systems   10  point ROS  was otherwise non pertinent or negative except as per HPI or as below.   Gait: Normal      Most recent or relevant cardiac/vascular testing:    Cardiac Cath 2/3/2025  S/p IVUS guided PCI with SURI x 2 to the Prox-Mid LAD  "lesion    Cardiac catheterization 5/23/2024   Mid LAD with a hemodynamically significant, calcified lesion warranting GDMT initiation and PCI with likely IVL if refractory CP    Prox RCA with patent prior 2009 Stent and only mild ISR    LVEDP is normal without gradient with minimal gradient on LV-AO pullback     Echo 5/23/2024  EF 49%, moderate diastolic dysfunction  Mild LAE  Mildly dilated aortic root, 4.1 cm      Objective:     /68   Pulse 68   Ht 5' 7\" (1.702 m)   Wt 105 kg (231 lb)   BMI 36.18 kg/m²     PHYSICAL EXAM:    General:  Normal appearance, no acute distress  Eyes:  Anicteric.  Oral mucosa:  Moist.  Neck:  No JVD. Carotid upstrokes are brisk without bruits.  No masses.  Chest:  Clear to auscultation   Cardiac:  No palpable PMI.  Normal S1 and S2.  No murmur gallop or rub.  Abdomen:  Soft and nontender. No palpable organomegaly or aortic enlargement.  Extremities:  No peripheral edema.  Musculoskeletal:  Symmetric.   Vascular:  Pedal pulses are intact.  Neuro:  Grossly symmetric.  Psych:  Alert and oriented x3.    No Known Allergies  Current Medications[1]  Past Medical History:   Diagnosis Date    Coronary artery disease     s/p stenting; s/p SURI prox LAD-mid LAD 2/3/2025    History of epidural anesthesia     back L3/L4    Hyperlipidemia     Hypertension      Past Surgical History:   Procedure Laterality Date    ANTERIOR FUSION LUMBAR SPINE      BACK SURGERY      CARDIAC CATHETERIZATION Left 5/23/2024    Procedure: Cardiac catheterization;  Surgeon: Jenifer Avendano DO;  Location: [facility];  Service: Cardiology    CARDIAC CATHETERIZATION N/A 5/23/2024    Procedure: Cardiac Coronary Angiogram;  Surgeon: Jenifer Avendano DO;  Location: [facility];  Service: Cardiology    CARDIAC CATHETERIZATION N/A 5/23/2024    Procedure: Cardiac FFR/IFR;  Surgeon: Jenifer Avendano DO;  Location: [facility];  Service: Cardiology    CARDIAC CATHETERIZATION N/A 2/3/2025    " Procedure: Cardiac catheterization;  Surgeon: Jenifer Avendano DO;  Location: [facility];  Service: Cardiology    CARDIAC CATHETERIZATION N/A 2/3/2025    Procedure: Cardiac Coronary Angiogram;  Surgeon: Jenifer Avendano DO;  Location: [facility];  Service: Cardiology    CARDIAC CATHETERIZATION N/A 2/3/2025    Procedure: Cardiac PCI;  Surgeon: Jenifer Avendano DO;  Location: [facility];  Service: Cardiology    CARPAL TUNNEL RELEASE      CORONARY ANGIOPLASTY WITH STENT PLACEMENT  06/23/2009    SURI to 95% prox RCA and 99% D2.    INGUINAL HERNIA REPAIR      NASAL TURBINATE REDUCTION Bilateral 10/14/2019    with outfracture - office procedure - Dr. Chapman    REPLACEMENT TOTAL KNEE      SHOULDER ARTHROPLASTY         CMP:   Lab Results   Component Value Date    K 3.7 02/04/2025     02/04/2025    CO2 24 02/04/2025    BUN 11 02/04/2025    CREATININE 0.73 02/04/2025    EGFR 93 02/04/2025     Lipid Profile:    Lab Results   Component Value Date    TRIG 104 04/27/2024    HDL 78 04/27/2024         Tobacco Use History[2]                 [1]   Current Outpatient Medications:     amLODIPine (NORVASC) 5 mg tablet, Take 1 tablet (5 mg total) by mouth daily, Disp: 90 tablet, Rfl: 3    aspirin 81 MG tablet, Take 81 mg by mouth in the morning., Disp: , Rfl:     Calcium-Magnesium-Vitamin D (CALCIUM MAGNESIUM PO), Take by mouth, Disp: , Rfl:     DULoxetine (CYMBALTA) 20 mg capsule, Take 20 mg by mouth every evening, Disp: , Rfl:     DULoxetine (CYMBALTA) 60 mg delayed release capsule, Take 60 mg by mouth in the morning., Disp: , Rfl:     ezetimibe (ZETIA) 10 mg tablet, Take 1 tablet (10 mg total) by mouth daily, Disp: 90 tablet, Rfl: 3    gabapentin (NEURONTIN) 300 mg capsule, Take 300 mg by mouth in the morning., Disp: , Rfl:     hydrOXYzine HCL (ATARAX) 10 mg tablet, Take 10 mg by mouth as needed in the morning and 10 mg as needed in the evening for itching, allergies or anxiety., Disp: , Rfl:      metoprolol succinate (TOPROL-XL) 25 mg 24 hr tablet, Take 1 tablet (25 mg total) by mouth daily, Disp: 90 tablet, Rfl: 3    Multiple Vitamin (MULTIVITAMIN) tablet, Take 1 tablet by mouth in the morning., Disp: , Rfl:     nitroglycerin (NITROSTAT) 0.4 mg SL tablet, Place 1 tablet (0.4 mg total) under the tongue every 5 (five) minutes as needed for chest pain, Disp: 25 tablet, Rfl: 3    rosuvastatin (CRESTOR) 40 MG tablet, Take 1 tablet (40 mg total) by mouth daily, Disp: 90 tablet, Rfl: 3    tamsulosin (FLOMAX) 0.4 mg, Take 0.8 mg by mouth daily with dinner, Disp: , Rfl:     ticagrelor (BRILINTA) 90 MG, Take 1 tablet (90 mg total) by mouth every 12 (twelve) hours, Disp: 180 tablet, Rfl: 2    isosorbide mononitrate (IMDUR) 30 mg 24 hr tablet, Take 1 tablet (30 mg total) by mouth daily (Patient not taking: Reported on 2025), Disp: 90 tablet, Rfl: 3    tiZANidine (ZANAFLEX) 4 mg tablet, Take 4 mg by mouth as needed for muscle spasms (Patient not taking: Reported on 2025), Disp: , Rfl:   [2]   Social History  Tobacco Use   Smoking Status Former    Current packs/day: 0.00    Types: Cigarettes    Quit date:     Years since quittin.4   Smokeless Tobacco Never

## 2025-05-21 NOTE — ASSESSMENT & PLAN NOTE
S/p SURI to pRCA and D2 6/2009 patent on recent cath  S/p SURI x 2 to proximal to mid LAD lesion, 2/3/2025  Continue asa, Brilinta, statin, BB, CCB

## 2025-05-23 ENCOUNTER — CLINICAL SUPPORT (OUTPATIENT)
Dept: CARDIAC REHAB | Facility: HOSPITAL | Age: 70
End: 2025-05-23
Payer: COMMERCIAL

## 2025-05-23 DIAGNOSIS — Z95.5 STATUS POST INSERTION OF DRUG ELUTING CORONARY ARTERY STENT: ICD-10-CM

## 2025-05-23 PROCEDURE — 93798 PHYS/QHP OP CAR RHAB W/ECG: CPT

## 2025-05-26 ENCOUNTER — APPOINTMENT (OUTPATIENT)
Dept: CARDIAC REHAB | Facility: HOSPITAL | Age: 70
End: 2025-05-26
Payer: COMMERCIAL

## 2025-05-28 ENCOUNTER — APPOINTMENT (OUTPATIENT)
Dept: CARDIAC REHAB | Facility: HOSPITAL | Age: 70
End: 2025-05-28
Payer: COMMERCIAL

## 2025-05-30 ENCOUNTER — APPOINTMENT (OUTPATIENT)
Dept: LAB | Facility: HOSPITAL | Age: 70
End: 2025-05-30
Payer: COMMERCIAL

## 2025-05-30 ENCOUNTER — HOSPITAL ENCOUNTER (OUTPATIENT)
Dept: ULTRASOUND IMAGING | Facility: HOSPITAL | Age: 70
Discharge: HOME | End: 2025-05-30
Attending: FAMILY MEDICINE
Payer: COMMERCIAL

## 2025-05-30 DIAGNOSIS — Z13.5 SCREENING FOR GLAUCOMA: ICD-10-CM

## 2025-05-30 DIAGNOSIS — E78.5 HYPERLIPIDEMIA, UNSPECIFIED HYPERLIPIDEMIA TYPE: ICD-10-CM

## 2025-05-30 DIAGNOSIS — Z87.891 HISTORY OF TOBACCO USE: ICD-10-CM

## 2025-05-30 LAB
ALBUMIN SERPL BCG-MCNC: 4.5 G/DL (ref 3.5–5)
ALP SERPL-CCNC: 44 U/L (ref 34–104)
ALT SERPL W P-5'-P-CCNC: 46 U/L (ref 7–52)
ANION GAP SERPL CALCULATED.3IONS-SCNC: 5 MMOL/L (ref 4–13)
AST SERPL W P-5'-P-CCNC: 41 U/L (ref 13–39)
BILIRUB SERPL-MCNC: 0.53 MG/DL (ref 0.2–1)
BUN SERPL-MCNC: 11 MG/DL (ref 5–25)
CALCIUM SERPL-MCNC: 9.6 MG/DL (ref 8.4–10.2)
CHLORIDE SERPL-SCNC: 109 MMOL/L (ref 96–108)
CHOLEST SERPL-MCNC: 135 MG/DL (ref ?–200)
CO2 SERPL-SCNC: 27 MMOL/L (ref 21–32)
CREAT SERPL-MCNC: 0.78 MG/DL (ref 0.6–1.3)
GFR SERPL CREATININE-BSD FRML MDRD: 90 ML/MIN/1.73SQ M
GLUCOSE P FAST SERPL-MCNC: 94 MG/DL (ref 65–99)
HDLC SERPL-MCNC: 78 MG/DL
LDLC SERPL CALC-MCNC: 47 MG/DL (ref 0–100)
NONHDLC SERPL-MCNC: 57 MG/DL
POTASSIUM SERPL-SCNC: 5.5 MMOL/L (ref 3.5–5.3)
PROT SERPL-MCNC: 7.3 G/DL (ref 6.4–8.4)
SODIUM SERPL-SCNC: 141 MMOL/L (ref 135–147)
TRIGL SERPL-MCNC: 52 MG/DL (ref ?–150)

## 2025-05-30 PROCEDURE — 36415 COLL VENOUS BLD VENIPUNCTURE: CPT

## 2025-05-30 PROCEDURE — 80061 LIPID PANEL: CPT

## 2025-05-30 PROCEDURE — 76775 US EXAM ABDO BACK WALL LIM: CPT

## 2025-05-30 PROCEDURE — 80053 COMPREHEN METABOLIC PANEL: CPT

## 2025-06-02 ENCOUNTER — APPOINTMENT (OUTPATIENT)
Dept: CARDIAC REHAB | Facility: HOSPITAL | Age: 70
End: 2025-06-02
Payer: COMMERCIAL

## 2025-06-04 ENCOUNTER — CLINICAL SUPPORT (OUTPATIENT)
Dept: CARDIAC REHAB | Facility: HOSPITAL | Age: 70
End: 2025-06-04
Payer: COMMERCIAL

## 2025-06-04 DIAGNOSIS — Z95.5 STATUS POST INSERTION OF DRUG ELUTING CORONARY ARTERY STENT: ICD-10-CM

## 2025-06-04 PROCEDURE — 93798 PHYS/QHP OP CAR RHAB W/ECG: CPT

## 2025-06-06 ENCOUNTER — APPOINTMENT (OUTPATIENT)
Dept: CARDIAC REHAB | Facility: HOSPITAL | Age: 70
End: 2025-06-06
Payer: COMMERCIAL

## 2025-06-09 ENCOUNTER — CLINICAL SUPPORT (OUTPATIENT)
Dept: CARDIAC REHAB | Facility: HOSPITAL | Age: 70
End: 2025-06-09
Payer: COMMERCIAL

## 2025-06-09 DIAGNOSIS — Z95.5 STATUS POST INSERTION OF DRUG ELUTING CORONARY ARTERY STENT: ICD-10-CM

## 2025-06-09 PROCEDURE — 93798 PHYS/QHP OP CAR RHAB W/ECG: CPT

## 2025-06-11 ENCOUNTER — CLINICAL SUPPORT (OUTPATIENT)
Dept: CARDIAC REHAB | Facility: HOSPITAL | Age: 70
End: 2025-06-11
Payer: COMMERCIAL

## 2025-06-11 DIAGNOSIS — Z95.5 STATUS POST INSERTION OF DRUG ELUTING CORONARY ARTERY STENT: ICD-10-CM

## 2025-06-11 PROCEDURE — 93798 PHYS/QHP OP CAR RHAB W/ECG: CPT

## 2025-06-16 ENCOUNTER — CLINICAL SUPPORT (OUTPATIENT)
Dept: CARDIAC REHAB | Facility: HOSPITAL | Age: 70
End: 2025-06-16
Payer: COMMERCIAL

## 2025-06-16 DIAGNOSIS — Z95.5 STATUS POST INSERTION OF DRUG ELUTING CORONARY ARTERY STENT: Primary | ICD-10-CM

## 2025-06-16 PROCEDURE — 93798 PHYS/QHP OP CAR RHAB W/ECG: CPT

## 2025-06-17 NOTE — PROGRESS NOTES
CARDIAC REHABILITATION   ASSESSMENT AND INDIVIDUALIZED TREATMENT PLAN  120 DAY          Today's date: 2025   # of Exercise Sessions Completed:  Patient name: Carlo Samuel      : 1955  Age: 70 y.o.       MRN: [id number]  Referring Physician: Jenifer Avendano DO  Cardiologist: Chuy Rajput MD  Provider: [facility]  Clinician: Mahogany Wallace, MPT, CCRP      Treatment is tailored to this patient's individual needs.  The ITP was reviewed with the patient and all questions were answered to their satisfaction.  Additional ITP documentation can be found electronically including daily and monthly exercise summaries, daily session notes with ECG summaries, education notes, daily medication reconciliation, and daily physician supervision.      SUMMARY 2025    Resting BP  124/62 - 128/64,  HR 53 - 60  Exercise /74 - 152/70,  HR 76 - 99  Exercise session details:  45 minutes,  2.9 METs  Telemetry:  NSR  Symptoms: Asymptomatic  Home exercise/ADLs: Patient plans to resume bike riding weather permitting. He returned to work full duty/time w/o issues. He is able to drive, community ambulate and perform all ADLs at home w/little difficulty.  Patient's subjective report of progress: Carlo is feeling better and more confident in his abilities. He stated he is working on reducing/eliminating his risk factors.  He is pleased w/his progress thus fat in CR. He plans to continue until program completion.   Clinical Comments: He has been compliant attending his sessions. He gives great effort during each visit. He is able to work w/in his THRs. He has correct hemodynamic responses to the activity. He has been asymptomatic. He rates his program a 4-5 on a 1-10 RPE Scale. His orthopedic back issues are his limiting factors. His program will be progressed as he is able to tolerate.       Dx: S/P PCI w/SURI to prox and mid LAD 2/3/2025    Description of Diagnosis: Cardiac Cath performed 2/3/2025  due to persistent angina despite increased medical therapy. S/p IVUS guided PCI with SURI x 2 to the Prox-Mid LAD lesion. Patient doing well, denies any recurrent symptoms.     Cardiac catheterization 5/23/2024   Mid LAD with a hemodynamically significant, calcified lesion warranting GDMT initiation and PCI with likely IVL if refractory CP    Prox RCA with patent prior 2009 Stent and only mild ISR    LVEDP is normal without gradient with minimal gradient on LV-AO pullback     Echo 5/23/2024  EF 49%, moderate diastolic dysfunction    Date of onset: 2/3/2025  Other Cardiac History: CAD, Stable Angina, HTN, HLD, Hx Smoking, Hx SURI 2009 and  Hx Cardiac Cath 2024      ASSESSMENT    Medical History:   Past Medical History:   Diagnosis Date    Coronary artery disease     s/p stenting; s/p SURI prox LAD-mid LAD 2/3/2025    History of epidural anesthesia     back L3/L4    Hyperlipidemia     Hypertension        Family History:  Family History   Problem Relation Name Age of Onset    Rheumatic fever Father      Heart disease Father      Cancer Father      Lung cancer Father         Allergies:   Patient has no known allergies.    Current Medications:   Current Outpatient Medications   Medication Sig Dispense Refill    amLODIPine (NORVASC) 5 mg tablet Take 1 tablet (5 mg total) by mouth daily 90 tablet 3    aspirin 81 MG tablet Take 81 mg by mouth in the morning.      Calcium-Magnesium-Vitamin D (CALCIUM MAGNESIUM PO) Take by mouth      DULoxetine (CYMBALTA) 20 mg capsule Take 20 mg by mouth every evening      DULoxetine (CYMBALTA) 60 mg delayed release capsule Take 60 mg by mouth in the morning.      ezetimibe (ZETIA) 10 mg tablet Take 1 tablet (10 mg total) by mouth daily 90 tablet 3    gabapentin (NEURONTIN) 300 mg capsule Take 300 mg by mouth in the morning.      hydrOXYzine HCL (ATARAX) 10 mg tablet Take 10 mg by mouth as needed in the morning and 10 mg as needed in the evening for itching, allergies or anxiety.       metoprolol succinate (TOPROL-XL) 25 mg 24 hr tablet Take 1 tablet (25 mg total) by mouth daily 90 tablet 3    Multiple Vitamin (MULTIVITAMIN) tablet Take 1 tablet by mouth in the morning.      nitroglycerin (NITROSTAT) 0.4 mg SL tablet Place 1 tablet (0.4 mg total) under the tongue every 5 (five) minutes as needed for chest pain 25 tablet 3    rosuvastatin (CRESTOR) 40 MG tablet Take 1 tablet (40 mg total) by mouth daily 90 tablet 3    tamsulosin (FLOMAX) 0.4 mg Take 0.8 mg by mouth daily with dinner      ticagrelor (BRILINTA) 90 MG Take 1 tablet (90 mg total) by mouth every 12 (twelve) hours 180 tablet 2     No current facility-administered medications for this visit.       Medication compliance: Yes   Comments: Pt reports to be compliant with medications.    Physical Limitations: Weakness, gait dysfunction and history lumbar spine fusion. Patient also has history of TKR and shoulder arthroplasty.     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  "   30 day, 3/24/2025: Has not resumed HEP yet   60 day, 4/21/2025: Has not resumed HEP yet   90 day - bike riding weather permitting  Other Comments:       SMART Exercise Goals:   10% improvement in functional capacity based on max METs achieved in initial fitness assessment  reduced dyspnea with physical activity    improved DASI score by 10%  increased exercise capacity by 40% based on peak METs tolerated in cardiac rehab exercise session  improved 6MWT distance by 10%    Patient Specific EXERCISE GOALS:       Ability to ambulate long distances w/out SOB  Tolerate moderate ADLs w/out symptoms  Return to work full time/duty    Functional Capacity Screening Tool:  [facility] Activity Status Index:  4.64 METs    NUTRITION ASSESSMENT:    Initial Weight:  234  Current Weight: 234  5/20/2025: 222, BMI 34.77  6/17/2025: 231#, BMI 36.18    Height:   Ht Readings from Last 1 Encounters:   05/21/25 5' 7\" (1.702 m)       Rate Your Plate Score: 60/81    Diabetes: N/A  A1c: 5.9    last measured: 2/4/2025    Lipid management: Discussed diet and lipid management and Last lipid profile 4/27/2024  Chol 177    HDL 78  LDL 78    Current Dietary Habits:  Follows a low sodium diet and hydrates regularly throughout the day. He been limiting sugar and saturated fats.     SMART Nutrition Goals:   eat whole grain breads, brown rice and whole grain cereals, eat 5 or more servings of fruits and vegetables a day, eat red meat once a week or less, choose lean beef or rarely eat beef, eat chicken and fish that is not fried, never add salt to food when cooking or at the table, choose low sodium canned, frozen/packaged foods or rarely/never eat, and rarely/never eat salty snacks    Patient Specific NUTRITION GOALS:     1. Weight loss, does not have a goal weight at this time   2. Reduce red meat intake   3. Increase intake of whole grains    Drug/Alcohol Use:   No      PSYCHOSOCIAL ASSESSMENT:    Date of last Assessment:  2/25/2025  Depression " "screening:  PHQ-9 = 9    Interpretation:  5-9 = Mild Depression  Anxiety screening:  MARINO-7 = 8  Interpretation: 5-9 = Mild anxiety    Pt self-report of depression and anxiety   Patient reports they are coping well with good social support and denies depression or anxiety  compliant with medical therapy for depression/anxiety  Currently takes Cymbalta    Self-reported stress level: 3- 5/10   Stressors:  Health, finances, employment and recent loss of his MIL  Stress Management Tools: read, exercise, spend time outside, and enjoy a hobby    SMART Psychosocial Goals:     Physical Fitness in [facility] Score < 3, Daily Activity in [facility] Score < 3, Quality of Life in [facility] Score < 3 , improved sleeping habits, and feel less tired with more energy    Patient Specific PSYCHOCOSOCIAL GOALS:    Reduce stress level to < 5/10  Improve sleep  Compliance w/current medication    Quality of Life Screen:  (Higher score indicates disease impact on QOL)  [facility] COOP score: 30/45     Social Support:   spouse  Community/Social Activities: Activities w/wife, especially outdoors. He states he can't wait for the weather to improve so he can spend more time outdoors.      Psychosocial Assessment as it relates to rehabilitation:   Patient denies issues with his family or home life that may affect their rehabilitation efforts.       OTHER CORE COMPONENT ASSESSMENT:    Tobacco Use:     Pt quit 2003   and has abstained    Anginal Symptoms:  \"It felt like someone was squeezing my neck, choking me\"   NTG use: Compliant with carrying NTG, Understands proper use, and Pt has not used NTG since event    SMART Goals:   consistent, controlled resting BP < 130/80, medication compliance, and reduced angina    Patient Specific CORE COMPONENT GOALS:    Reduce reliance on medication  Monitor vitals at home  Remain smoke free and continue to avoid second hand smoke/other respiratory irritants    INDIVIDUALIZED TREATMENT PLAN      EXERCISE GOALS and " PLAN      Progress toward Exercise goals:   Pt is progressing and showing improvement  toward the following goals:  has been compliant in attending his scheduled cardiac rehab sessions,  resumed all ADLS at home, returned to work, MET level  increased.  , Will continue to educate and progress as tolerated.    Exercise Plan:    education on home exercise guidelines, home exercise 30+ mins 2 days opposite CR, and Group class: Risk Factors for Heart Disease    The patient was counseled on exercise guidelines to achieve a minimum of 150 mins/wk of moderate intensity (RPE 4-6) exercise and encouraged to add 1-2 days of exercise on opposite days of cardiac rehab as tolerated.       PHYSICIAN PRESCRIBED EXERCISE:    Current Aerobic Exercise Prescription:      Frequency: 2-3 days/week   Supplement with home exercise 2+ days/wk as tolerated       Minutes: 45         METS: 2.9           HR: RHR +30-40bpm   RPE: 4-5/10         Modalities: Treadmill, UBE, NuStep, and Recumbent bike     Exercise workloads will be progressed gradually as tolerated, within limits of patient's ability, and according to the patient's response to the exercise program.      Aerobic Exercise Prescription Plan for Progression   Frequency: 2-3 days/week of cardiac rehab       Supplement with home exercise 2+ days/wk as tolerated    Minutes: 45-50     >150 mins/wk of moderate intensity exercise   METS: 2.9-3.75   HR: RHR +30-40bpm     RPE: 4-6/10   Modalities: Treadmill, UBE, NuStep, and Recumbent bike    Strength trainin-3 days / week  12-15 repetitions  1-2 sets per modality    Modalities: Leg Press, Chest Press, Pull Downs, and UE Free Weights    Home Exercise:Plans to resume bike riding and exercise at his local fitness center when feeling a little better.      Exercise Education: benefit of exercise for CAD risk factors, home exercise guidelines, and RPE scale     Readiness to change: Action:  (Changing behavior)      NUTRITION GOALS AND  "PLAN      Nutritional   Reviewed patient's Rate your Plate. Discussed key elements of heart healthy eating. Reviewed patient goals for dietary modifications and their clinical implications.  Reviewed most recent lipid profile.     Patient's progress toward Nutrition goals:    Pt is progressing and showing improvement  toward the following goals:  Eating a heart healthy diet, hydrating regularly and weight loss.  , Will continue to educate and progress as tolerated.      Nutrition Plan:   group class: Reading Food Labels, increase intake of whole grains, replace refined flours with whole grains, increase daily intake of fruits and vegetables, choose lean red meat, cook without fats or oils, never/rarely eat fried foods, choose low sodium canned, frozen, packaged foods or rarely eat these foods, rarely/never eat salty snacks, and choose low sugar desserts and sweets    Measurable goals were based Rate Your Plate Dietary Self-Assessment. These are the areas in which the patient could score higher on the assessment.  Goals include recommendations for a heart healthy diet based on American Heart Association.    Nutrition Education:   heart healthy eating principles  low sodium diet  maintaining hydration  \"Plant Based Eating\"    Readiness to change: Action:  (Changing behavior)      PSYCHOSOCIAL GOALS AND PLAN    Psychosocial Assessment as it relates to rehabilitation:   Patient denies issues with his family or home life that may affect their rehabilitation efforts. He states all his needs are being met.     Patient's progress toward Psychosocial goals:    Pt is progressing and showing improvement  toward the following goals:  Stress level reduced to 3-5/10 .  , Will continue to educate and progress as tolerated.He participates in social activities w/family members.     Psychosocial Intervention/plan:   Class: Stress and Your Health, Practice relaxation techniques, Exercise, and Spend time outdoors    Psychosocial " Education: benefits of a positive support system and class:  Stress and Your Health     Information to utilize [employer] was provided as well as contact information for counseling through  Behavioral Health and group psychotherapy groups available.    Readiness to change: Action:  (Changing behavior)      OTHER CORE COMPONENTS GOALS and PLAN      Blood Pressure will be monitored throughout the program and cardiologist will be notified of elevated trends.    Pt will be encouraged to monitor home BP if advised by cardiologist.    Tobacco Plan:   Pt quit 2003 and has abstained since quitting.  He vaoids second hand smoke and other respiratory irritants.     Progress toward Core Component goals:   Pt is progressing and showing improvement  toward the following goals:  Remains smoke free, has been asymptomatic, states his BP at home is well controlled.  , Will continue to educate and progress as tolerated.    Other Core Components Intervention:   group class: Understanding Heart Disease, medication compliance, avoid places with second hand smoke, avoid processed foods, and engage in regular exercise    Group and Individual Education:  components of blood pressure management, low sodium diet and heart failure, and proper use of sublingual NTG    Readiness to change: Action:  (Changing behavior)

## 2025-06-18 ENCOUNTER — DOCUMENTATION (OUTPATIENT)
Dept: CARDIOLOGY CLINIC | Facility: CLINIC | Age: 70
End: 2025-06-18

## 2025-06-18 ENCOUNTER — CLINICAL SUPPORT (OUTPATIENT)
Dept: CARDIAC REHAB | Facility: HOSPITAL | Age: 70
End: 2025-06-18
Payer: COMMERCIAL

## 2025-06-18 DIAGNOSIS — Z95.5 STATUS POST INSERTION OF DRUG ELUTING CORONARY ARTERY STENT: ICD-10-CM

## 2025-06-18 PROCEDURE — 93798 PHYS/QHP OP CAR RHAB W/ECG: CPT

## 2025-06-18 NOTE — PROGRESS NOTES
Received request to hold DAPT for epidural steroid injection.    Patient underwent complex PCI (SURI x 2) to LAD lesions.    Discussed with interventional cardiology and recommend continuing DAPT without interruption for minimum of 6 months (through 8/3/2025)    Continue aspirin without interruption    Will need to resume DAPT ASAP after procedure

## 2025-06-23 ENCOUNTER — CLINICAL SUPPORT (OUTPATIENT)
Dept: CARDIAC REHAB | Facility: HOSPITAL | Age: 70
End: 2025-06-23
Payer: COMMERCIAL

## 2025-06-23 DIAGNOSIS — Z95.5 STATUS POST INSERTION OF DRUG ELUTING CORONARY ARTERY STENT: ICD-10-CM

## 2025-06-23 PROCEDURE — 93798 PHYS/QHP OP CAR RHAB W/ECG: CPT

## 2025-06-25 ENCOUNTER — CLINICAL SUPPORT (OUTPATIENT)
Dept: CARDIAC REHAB | Facility: HOSPITAL | Age: 70
End: 2025-06-25
Payer: COMMERCIAL

## 2025-06-25 DIAGNOSIS — I25.10 CORONARY ARTERY DISEASE INVOLVING NATIVE CORONARY ARTERY OF NATIVE HEART WITHOUT ANGINA PECTORIS: ICD-10-CM

## 2025-06-25 DIAGNOSIS — Z95.5 STATUS POST INSERTION OF DRUG ELUTING CORONARY ARTERY STENT: ICD-10-CM

## 2025-06-25 PROCEDURE — 93798 PHYS/QHP OP CAR RHAB W/ECG: CPT

## 2025-06-25 RX ORDER — AMLODIPINE BESYLATE 5 MG/1
5 TABLET ORAL DAILY
Qty: 90 TABLET | Refills: 1 | Status: SHIPPED | OUTPATIENT
Start: 2025-06-25

## 2025-06-25 NOTE — TELEPHONE ENCOUNTER
Reason for call:   [x] Refill   [] Prior Auth  [] Other:     Office:   [] PCP/Provider -   [x] Specialty/Provider -       amLODIPine (NORVASC) 5 mg tablet 5 mg, Oral, Daily       Quantity: 90    [facility]    Local [facility]   Does the patient have enough for 3 days?   [] Yes   [x] No - Send as HP to POD    Mail Away Pharmacy   Does the patient have enough for 10 days?   [] Yes   [] No - Send as HP to POD

## 2025-06-30 ENCOUNTER — APPOINTMENT (OUTPATIENT)
Dept: CARDIAC REHAB | Facility: HOSPITAL | Age: 70
End: 2025-06-30
Payer: COMMERCIAL

## 2025-07-02 ENCOUNTER — APPOINTMENT (OUTPATIENT)
Dept: CARDIAC REHAB | Facility: HOSPITAL | Age: 70
End: 2025-07-02
Payer: COMMERCIAL

## 2025-07-07 ENCOUNTER — CLINICAL SUPPORT (OUTPATIENT)
Dept: CARDIAC REHAB | Facility: HOSPITAL | Age: 70
End: 2025-07-07
Payer: COMMERCIAL

## 2025-07-07 DIAGNOSIS — Z95.5 STATUS POST INSERTION OF DRUG ELUTING CORONARY ARTERY STENT: ICD-10-CM

## 2025-07-07 PROCEDURE — 93798 PHYS/QHP OP CAR RHAB W/ECG: CPT

## 2025-07-09 ENCOUNTER — CLINICAL SUPPORT (OUTPATIENT)
Dept: CARDIAC REHAB | Facility: HOSPITAL | Age: 70
End: 2025-07-09
Payer: COMMERCIAL

## 2025-07-09 DIAGNOSIS — Z95.5 STATUS POST INSERTION OF DRUG ELUTING CORONARY ARTERY STENT: Primary | ICD-10-CM

## 2025-07-09 PROCEDURE — 93798 PHYS/QHP OP CAR RHAB W/ECG: CPT

## 2025-07-14 ENCOUNTER — APPOINTMENT (OUTPATIENT)
Dept: CARDIAC REHAB | Facility: HOSPITAL | Age: 70
End: 2025-07-14
Payer: COMMERCIAL

## 2025-07-16 ENCOUNTER — CLINICAL SUPPORT (OUTPATIENT)
Dept: CARDIAC REHAB | Facility: HOSPITAL | Age: 70
End: 2025-07-16
Payer: COMMERCIAL

## 2025-07-16 DIAGNOSIS — Z95.5 STATUS POST INSERTION OF DRUG ELUTING CORONARY ARTERY STENT: ICD-10-CM

## 2025-07-16 PROCEDURE — 93798 PHYS/QHP OP CAR RHAB W/ECG: CPT

## 2025-07-21 ENCOUNTER — CLINICAL SUPPORT (OUTPATIENT)
Dept: CARDIAC REHAB | Facility: HOSPITAL | Age: 70
End: 2025-07-21
Payer: COMMERCIAL

## 2025-07-21 DIAGNOSIS — Z95.5 STATUS POST INSERTION OF DRUG ELUTING CORONARY ARTERY STENT: Primary | ICD-10-CM

## 2025-07-21 PROCEDURE — 93798 PHYS/QHP OP CAR RHAB W/ECG: CPT

## 2025-07-23 ENCOUNTER — APPOINTMENT (OUTPATIENT)
Dept: CARDIAC REHAB | Facility: HOSPITAL | Age: 70
End: 2025-07-23
Payer: COMMERCIAL

## 2025-07-24 ENCOUNTER — TELEPHONE (OUTPATIENT)
Age: 70
End: 2025-07-24

## 2025-07-24 NOTE — TELEPHONE ENCOUNTER
Called patient and left message for him to stop by for copy of [place] paperwork.  I did fax it in to [facility] on 7/14/25 and copy in chart.

## 2025-07-24 NOTE — TELEPHONE ENCOUNTER
Caller: Carlo Samuel    Doctor: JESSICA Werner     Reason for call: Patient called stating that he would come by the office to  the FMLA paperwork he had dropped off sometime on Monday(7/28). Please call patient back if the paperwork will not be ready for  on Monday.     Call back#: [phone]

## 2025-07-25 ENCOUNTER — APPOINTMENT (OUTPATIENT)
Dept: CARDIAC REHAB | Facility: HOSPITAL | Age: 70
End: 2025-07-25
Payer: COMMERCIAL

## 2025-07-28 ENCOUNTER — CLINICAL SUPPORT (OUTPATIENT)
Dept: CARDIAC REHAB | Facility: HOSPITAL | Age: 70
End: 2025-07-28
Payer: COMMERCIAL

## 2025-07-28 DIAGNOSIS — Z95.5 STATUS POST INSERTION OF DRUG ELUTING CORONARY ARTERY STENT: Primary | ICD-10-CM

## 2025-07-28 PROCEDURE — 93798 PHYS/QHP OP CAR RHAB W/ECG: CPT

## 2025-08-04 ENCOUNTER — CLINICAL SUPPORT (OUTPATIENT)
Dept: CARDIAC REHAB | Facility: HOSPITAL | Age: 70
End: 2025-08-04
Payer: COMMERCIAL

## 2025-08-04 DIAGNOSIS — Z95.5 STATUS POST INSERTION OF DRUG ELUTING CORONARY ARTERY STENT: Primary | ICD-10-CM

## 2025-08-04 PROCEDURE — 93798 PHYS/QHP OP CAR RHAB W/ECG: CPT

## 2025-08-06 ENCOUNTER — CLINICAL SUPPORT (OUTPATIENT)
Dept: CARDIAC REHAB | Facility: HOSPITAL | Age: 70
End: 2025-08-06
Payer: COMMERCIAL

## 2025-08-06 DIAGNOSIS — Z95.5 STATUS POST INSERTION OF DRUG ELUTING CORONARY ARTERY STENT: Primary | ICD-10-CM

## 2025-08-06 PROCEDURE — 93798 PHYS/QHP OP CAR RHAB W/ECG: CPT

## 2025-08-08 ENCOUNTER — TELEPHONE (OUTPATIENT)
Age: 70
End: 2025-08-08

## 2025-08-08 DIAGNOSIS — I25.10 CORONARY ARTERY DISEASE INVOLVING NATIVE CORONARY ARTERY OF NATIVE HEART WITHOUT ANGINA PECTORIS: ICD-10-CM

## 2025-08-08 DIAGNOSIS — I20.89 STABLE ANGINA (HCC): Primary | ICD-10-CM

## 2025-08-08 RX ORDER — ISOSORBIDE MONONITRATE 30 MG/1
30 TABLET, EXTENDED RELEASE ORAL DAILY
COMMUNITY
End: 2025-08-08 | Stop reason: SDUPTHER

## 2025-08-08 RX ORDER — ISOSORBIDE MONONITRATE 30 MG/1
30 TABLET, EXTENDED RELEASE ORAL DAILY
Qty: 90 TABLET | Refills: 3 | Status: SHIPPED | OUTPATIENT
Start: 2025-08-08

## 2025-08-13 ENCOUNTER — CLINICAL SUPPORT (OUTPATIENT)
Dept: CARDIAC REHAB | Facility: HOSPITAL | Age: 70
End: 2025-08-13
Payer: COMMERCIAL

## 2025-08-18 ENCOUNTER — CLINICAL SUPPORT (OUTPATIENT)
Dept: CARDIAC REHAB | Facility: HOSPITAL | Age: 70
End: 2025-08-18
Payer: COMMERCIAL

## 2025-08-18 DIAGNOSIS — Z95.5 STATUS POST INSERTION OF DRUG ELUTING CORONARY ARTERY STENT: ICD-10-CM

## 2025-08-18 PROCEDURE — 93798 PHYS/QHP OP CAR RHAB W/ECG: CPT

## 2025-08-20 ENCOUNTER — CLINICAL SUPPORT (OUTPATIENT)
Dept: CARDIAC REHAB | Facility: HOSPITAL | Age: 70
End: 2025-08-20
Payer: COMMERCIAL

## 2025-08-20 DIAGNOSIS — Z95.5 STATUS POST INSERTION OF DRUG ELUTING CORONARY ARTERY STENT: ICD-10-CM

## 2025-08-20 PROCEDURE — 93798 PHYS/QHP OP CAR RHAB W/ECG: CPT

## (undated) DEVICE — BALLOON EUPHORA RX 1.5 X 15MM

## (undated) DEVICE — PRESSURE GUIDEWIRE: Brand: COMET™ II

## (undated) DEVICE — CATH BAL SAPPHIRE II PRO 1 X 10MM

## (undated) DEVICE — CATH DIAG 5FR .045 100CM FR4

## (undated) DEVICE — GLIDESHEATH BASIC HYDROPHILIC COATED INTRODUCER SHEATH: Brand: GLIDESHEATH

## (undated) DEVICE — GUIDEWIRE WHOLEY HI TORQUE INTERM MOD J .035 145CM

## (undated) DEVICE — BALLOON EUPHORA RX 2.5 X 15MM

## (undated) DEVICE — BALLOON EUPHORA RX 2 X 15MM

## (undated) DEVICE — CATH GUIDE LAUNCHER 6FR EBU 3.5

## (undated) DEVICE — CORONARY IMAGING CATHETER: Brand: OPTICROSS™ 6 HD

## (undated) DEVICE — GUIDELINER CATHETERS ARE INTENDED TO BE USED IN CONJUNCTION WITH GUIDE CATHETERS TO ACCESS DISCRETE REGIONS OF THE CORONARY AND/OR PERIPHERAL VASCULATURE, AND TO FACILITATE PLACEMENT OF INTERVENTIONAL DEVICES.: Brand: GUIDELINER® V3 CATHETER

## (undated) DEVICE — RUNTHROUGH NS EXTRA FLOPPY PTCA GUIDEWIRE: Brand: RUNTHROUGH

## (undated) DEVICE — TR BAND RADIAL ARTERY COMPRESSION DEVICE: Brand: TR BAND

## (undated) DEVICE — DGW .035 FC J3MM 260CM TEF: Brand: EMERALD

## (undated) DEVICE — Device

## (undated) DEVICE — BALLOON NC EUPHORA 3.25 X 15MM

## (undated) DEVICE — RADIFOCUS OPTITORQUE ANGIOGRAPHIC CATHETER: Brand: OPTITORQUE